# Patient Record
Sex: FEMALE | Race: BLACK OR AFRICAN AMERICAN | NOT HISPANIC OR LATINO | Employment: OTHER | URBAN - METROPOLITAN AREA
[De-identification: names, ages, dates, MRNs, and addresses within clinical notes are randomized per-mention and may not be internally consistent; named-entity substitution may affect disease eponyms.]

---

## 2019-02-10 ENCOUNTER — OFFICE VISIT (OUTPATIENT)
Dept: URGENT CARE | Facility: CLINIC | Age: 74
End: 2019-02-10
Payer: MEDICARE

## 2019-02-10 VITALS
DIASTOLIC BLOOD PRESSURE: 79 MMHG | HEART RATE: 81 BPM | BODY MASS INDEX: 26.46 KG/M2 | RESPIRATION RATE: 19 BRPM | HEIGHT: 64 IN | SYSTOLIC BLOOD PRESSURE: 117 MMHG | TEMPERATURE: 99 F | OXYGEN SATURATION: 98 % | WEIGHT: 155 LBS

## 2019-02-10 DIAGNOSIS — B34.9 VIRAL SYNDROME: Primary | ICD-10-CM

## 2019-02-10 DIAGNOSIS — R50.9 FEVER, UNSPECIFIED FEVER CAUSE: ICD-10-CM

## 2019-02-10 DIAGNOSIS — R68.89 FLU-LIKE SYMPTOMS: ICD-10-CM

## 2019-02-10 DIAGNOSIS — R11.0 NAUSEA: ICD-10-CM

## 2019-02-10 LAB
CTP QC/QA: YES
FLUAV AG NPH QL: NEGATIVE
FLUBV AG NPH QL: NEGATIVE

## 2019-02-10 PROCEDURE — 87804 INFLUENZA ASSAY W/OPTIC: CPT | Mod: QW,S$GLB,, | Performed by: FAMILY MEDICINE

## 2019-02-10 PROCEDURE — 99214 OFFICE O/P EST MOD 30 MIN: CPT | Mod: S$GLB,,, | Performed by: FAMILY MEDICINE

## 2019-02-10 PROCEDURE — 99214 PR OFFICE/OUTPT VISIT, EST, LEVL IV, 30-39 MIN: ICD-10-PCS | Mod: S$GLB,,, | Performed by: FAMILY MEDICINE

## 2019-02-10 PROCEDURE — 87804 POCT INFLUENZA A/B: ICD-10-PCS | Mod: 59,QW,S$GLB, | Performed by: FAMILY MEDICINE

## 2019-02-10 RX ORDER — TOPIRAMATE 100 MG/1
100 TABLET, FILM COATED ORAL
Status: ON HOLD | COMMUNITY
Start: 2019-01-14 | End: 2019-07-15 | Stop reason: CLARIF

## 2019-02-10 RX ORDER — ONDANSETRON 4 MG/1
4 TABLET, ORALLY DISINTEGRATING ORAL EVERY 8 HOURS PRN
Qty: 20 TABLET | Refills: 0 | Status: SHIPPED | OUTPATIENT
Start: 2019-02-10 | End: 2019-02-17

## 2019-02-10 RX ORDER — MECLIZINE HYDROCHLORIDE 25 MG/1
TABLET ORAL 3 TIMES DAILY PRN
COMMUNITY
Start: 2018-12-21

## 2019-02-10 RX ORDER — AMITRIPTYLINE HYDROCHLORIDE 25 MG/1
20 TABLET, FILM COATED ORAL NIGHTLY
Status: ON HOLD | COMMUNITY
Start: 2019-01-14 | End: 2019-07-15 | Stop reason: CLARIF

## 2019-02-10 RX ORDER — BUTALBITAL, ACETAMINOPHEN AND CAFFEINE 50; 325; 40 MG/1; MG/1; MG/1
TABLET ORAL
COMMUNITY
Start: 2018-12-17

## 2019-02-10 RX ORDER — OSELTAMIVIR PHOSPHATE 75 MG/1
75 CAPSULE ORAL 2 TIMES DAILY
Qty: 10 CAPSULE | Refills: 0 | Status: SHIPPED | OUTPATIENT
Start: 2019-02-10 | End: 2019-02-15

## 2019-02-10 RX ORDER — TORSEMIDE 20 MG/1
TABLET ORAL
COMMUNITY
Start: 2018-12-18

## 2019-02-10 RX ORDER — PROMETHAZINE HYDROCHLORIDE 12.5 MG/1
12.5 TABLET ORAL EVERY 6 HOURS PRN
Status: ON HOLD | COMMUNITY
End: 2019-10-10 | Stop reason: CLARIF

## 2019-02-10 NOTE — PATIENT INSTRUCTIONS
"  Viral Syndrome (Adult)  A viral illness may cause a number of symptoms. The symptoms depend on the part of the body that the virus affects. If it settles in your nose, throat, and lungs, it may cause cough, sore throat, congestion, and sometimes headache. If it settles in your stomach and intestinal tract, it may cause vomiting and diarrhea. Sometimes it causes vague symptoms like "aching all over," feeling tired, loss of appetite, or fever.  A viral illness usually lasts 1 to 2 weeks, but sometimes it lasts longer. In some cases, a more serious infection can look like a viral syndrome in the first few days of the illness. You may need another exam and additional tests to know the difference. Watch for the warning signs listed below.  Home care  Follow these guidelines for taking care of yourself at home:  · If symptoms are severe, rest at home for the first 2 to 3 days.  · Stay away from cigarette smoke - both your smoke and the smoke from others.  · You may use over-the-counter acetaminophen or ibuprofen for fever, muscle aching, and headache, unless another medicine was prescribed for this. If you have chronic liver or kidney disease or ever had a stomach ulcer or GI bleeding, talk with your doctor before using these medicines. No one who is younger than 18 and ill with a fever should take aspirin. It may cause severe disease or death.  · Your appetite may be poor, so a light diet is fine. Avoid dehydration by drinking 8 to 12 8-ounce glasses of fluids each day. This may include water; orange juice; lemonade; apple, grape, and cranberry juice; clear fruit drinks; electrolyte replacement and sports drinks; and decaffeinated teas and coffee. If you have been diagnosed with a kidney disease, ask your doctor how much and what types of fluids you should drink to prevent dehydration. If you have kidney disease, drinking too much fluid can cause it build up in the your body and be dangerous to your " health.  · Over-the-counter remedies won't shorten the length of the illness but may be helpful for cough, sore throat; and nasal and sinus congestion. Don't use decongestants if you have high blood pressure.  Follow-up care  Follow up with your healthcare provider if you do not improve over the next week.  Call 911  Get emergency medical care if any of the following occur:  · Convulsion  · Feeling weak, dizzy, or like you are going to faint  · Chest pain, shortness of breath, wheezing, or difficulty breathing  When to seek medical advice  Call your healthcare provider right away if any of these occur:  · Cough with lots of colored sputum (mucus) or blood in your sputum  · Chest pain, shortness of breath, wheezing, or difficulty breathing  · Severe headache; face, neck, or ear pain  · Severe, constant pain in the lower right side of your belly (abdominal)  · Continued vomiting (cant keep liquids down)  · Frequent diarrhea (more than 5 times a day); blood (red or black color) or mucus in diarrhea  · Feeling weak, dizzy, or like you are going to faint  · Extreme thirst  · Fever of 100.4°F (38°C) or higher, or as directed by your healthcare provider  Date Last Reviewed: 9/25/2015  © 7689-4369 [employer]. [address] [place] [address], [place] [address]. All rights reserved. This information is not intended as a substitute for professional medical care. Always follow your healthcare professional's instructions.      Follow up with your doctor in a few days as needed.  Return to the urgent care or go to the ER if symptoms get worse.    True Holman MD

## 2019-02-10 NOTE — PROGRESS NOTES
"Subjective:       Patient ID: Jose Francisco Martines is a 73 y.o. female.    Vitals:  height is 5' 4" (1.626 m) and weight is 70.3 kg (155 lb). Her oral temperature is 98.7 °F (37.1 °C). Her blood pressure is 117/79 and her pulse is 81. Her respiration is 19 and oxygen saturation is 98%.     Chief Complaint: URI (body aches, fever, sore throat, fatigue)    URI    This is a new problem. The current episode started in the past 7 days (Friday). The problem has been gradually worsening. The maximum temperature recorded prior to her arrival was 101 - 101.9 F. The fever has been present for 1 to 2 days. Associated symptoms include a sore throat. Pertinent negatives include no chest pain, congestion, coughing, diarrhea, dysuria, headaches, nausea, rash or vomiting. She has tried acetaminophen for the symptoms. The treatment provided mild relief.       Constitution: Positive for appetite change, chills, fatigue and fever.   HENT: Positive for sore throat. Negative for congestion.    Neck: Negative for painful lymph nodes.   Cardiovascular: Negative for chest pain and leg swelling.   Eyes: Negative for double vision and blurred vision.   Respiratory: Negative for cough and shortness of breath.    Gastrointestinal: Negative for nausea, vomiting and diarrhea.   Genitourinary: Negative for dysuria, frequency, urgency and history of kidney stones.   Musculoskeletal: Positive for muscle ache. Negative for joint pain, joint swelling and muscle cramps.   Skin: Negative for color change, pale, rash, erythema and bruising.   Allergic/Immunologic: Negative for seasonal allergies.   Neurological: Negative for dizziness, history of vertigo, light-headedness, passing out and headaches.   Hematologic/Lymphatic: Negative for swollen lymph nodes.   Psychiatric/Behavioral: Negative for nervous/anxious, sleep disturbance and depression. The patient is not nervous/anxious.        Objective:      Physical Exam   Constitutional: She is oriented to " person, place, and time. She appears well-developed and well-nourished.   HENT:   Head: Normocephalic and atraumatic.   Eyes: EOM are normal. Pupils are equal, round, and reactive to light.   Neck: Normal range of motion. Neck supple.   Cardiovascular: Normal rate and regular rhythm.   Murmur heard.  Pulmonary/Chest: Breath sounds normal. No respiratory distress. She has no wheezes. She has no rales.   Abdominal: Soft. Bowel sounds are normal. She exhibits no distension. There is no tenderness. There is no rebound and no guarding.   Lymphadenopathy:     She has no cervical adenopathy.   Neurological: She is alert and oriented to person, place, and time. No cranial nerve deficit. She exhibits normal muscle tone. Coordination normal.   Skin: Skin is warm. Capillary refill takes less than 2 seconds. No rash noted. No erythema. No pallor.   Psychiatric: She has a normal mood and affect. Her behavior is normal. Judgment and thought content normal.   Vitals reviewed.      Assessment:       1. Viral syndrome    2. Fever, unspecified fever cause    3. Flu-like symptoms    4. Nausea        Plan:         Viral syndrome  -     oseltamivir (TAMIFLU) 75 MG capsule; Take 1 capsule (75 mg total) by mouth 2 (two) times daily. for 5 days  Dispense: 10 capsule; Refill: 0    Fever, unspecified fever cause  -     POCT Influenza A/B    Flu-like symptoms  -     oseltamivir (TAMIFLU) 75 MG capsule; Take 1 capsule (75 mg total) by mouth 2 (two) times daily. for 5 days  Dispense: 10 capsule; Refill: 0    Nausea  -     ondansetron (ZOFRAN-ODT) 4 MG TbDL; Take 1 tablet (4 mg total) by mouth every 8 (eight) hours as needed (nausea/vomit).  Dispense: 20 tablet; Refill: 0          Patient Instructions     Viral Syndrome (Adult)  A viral illness may cause a number of symptoms. The symptoms depend on the part of the body that the virus affects. If it settles in your nose, throat, and lungs, it may cause cough, sore throat, congestion, and  "sometimes headache. If it settles in your stomach and intestinal tract, it may cause vomiting and diarrhea. Sometimes it causes vague symptoms like "aching all over," feeling tired, loss of appetite, or fever.  A viral illness usually lasts 1 to 2 weeks, but sometimes it lasts longer. In some cases, a more serious infection can look like a viral syndrome in the first few days of the illness. You may need another exam and additional tests to know the difference. Watch for the warning signs listed below.  Home care  Follow these guidelines for taking care of yourself at home:  · If symptoms are severe, rest at home for the first 2 to 3 days.  · Stay away from cigarette smoke - both your smoke and the smoke from others.  · You may use over-the-counter acetaminophen or ibuprofen for fever, muscle aching, and headache, unless another medicine was prescribed for this. If you have chronic liver or kidney disease or ever had a stomach ulcer or GI bleeding, talk with your doctor before using these medicines. No one who is younger than 18 and ill with a fever should take aspirin. It may cause severe disease or death.  · Your appetite may be poor, so a light diet is fine. Avoid dehydration by drinking 8 to 12 8-ounce glasses of fluids each day. This may include water; orange juice; lemonade; apple, grape, and cranberry juice; clear fruit drinks; electrolyte replacement and sports drinks; and decaffeinated teas and coffee. If you have been diagnosed with a kidney disease, ask your doctor how much and what types of fluids you should drink to prevent dehydration. If you have kidney disease, drinking too much fluid can cause it build up in the your body and be dangerous to your health.  · Over-the-counter remedies won't shorten the length of the illness but may be helpful for cough, sore throat; and nasal and sinus congestion. Don't use decongestants if you have high blood pressure.  Follow-up care  Follow up with your healthcare " provider if you do not improve over the next week.  Call 911  Get emergency medical care if any of the following occur:  · Convulsion  · Feeling weak, dizzy, or like you are going to faint  · Chest pain, shortness of breath, wheezing, or difficulty breathing  When to seek medical advice  Call your healthcare provider right away if any of these occur:  · Cough with lots of colored sputum (mucus) or blood in your sputum  · Chest pain, shortness of breath, wheezing, or difficulty breathing  · Severe headache; face, neck, or ear pain  · Severe, constant pain in the lower right side of your belly (abdominal)  · Continued vomiting (cant keep liquids down)  · Frequent diarrhea (more than 5 times a day); blood (red or black color) or mucus in diarrhea  · Feeling weak, dizzy, or like you are going to faint  · Extreme thirst  · Fever of 100.4°F (38°C) or higher, or as directed by your healthcare provider  Date Last Reviewed: 9/25/2015  © 1161-6002 [employer]. [address] [place] [address]. All rights reserved. This information is not intended as a substitute for professional medical care. Always follow your healthcare professional's instructions.      Follow up with your doctor in a few days as needed.  Return to the urgent care or go to the ER if symptoms get worse.    True Holman MD

## 2019-02-21 ENCOUNTER — HOSPITAL ENCOUNTER (OUTPATIENT)
Facility: OTHER | Age: 74
Discharge: HOME | End: 2019-02-21
Attending: INTERNAL MEDICINE | Admitting: INTERNAL MEDICINE
Payer: MEDICARE

## 2019-02-21 VITALS
TEMPERATURE: 98 F | DIASTOLIC BLOOD PRESSURE: 69 MMHG | RESPIRATION RATE: 16 BRPM | HEART RATE: 94 BPM | SYSTOLIC BLOOD PRESSURE: 112 MMHG | OXYGEN SATURATION: 99 % | HEIGHT: 64 IN | WEIGHT: 156 LBS | BODY MASS INDEX: 26.63 KG/M2

## 2019-02-21 DIAGNOSIS — N18.6 ESRD (END STAGE RENAL DISEASE): ICD-10-CM

## 2019-02-21 DIAGNOSIS — T82.898A PROBLEM WITH DIALYSIS ACCESS: ICD-10-CM

## 2019-02-21 LAB
APTT BLDCRRT: 78.7 SEC
INR PPP: 4.7
POCT GLUCOSE: 137 MG/DL (ref 70–110)
PROTHROMBIN TIME: 51.7 SEC

## 2019-02-21 PROCEDURE — 36415 COLL VENOUS BLD VENIPUNCTURE: CPT

## 2019-02-21 PROCEDURE — 85610 PROTHROMBIN TIME: CPT

## 2019-02-21 PROCEDURE — 82962 GLUCOSE BLOOD TEST: CPT | Performed by: INTERNAL MEDICINE

## 2019-02-21 PROCEDURE — 85730 THROMBOPLASTIN TIME PARTIAL: CPT

## 2019-02-21 RX ORDER — SODIUM CHLORIDE 0.9 % (FLUSH) 0.9 %
5 SYRINGE (ML) INJECTION
Status: DISCONTINUED | OUTPATIENT
Start: 2019-02-21 | End: 2019-02-21 | Stop reason: HOSPADM

## 2019-02-21 RX ORDER — LIDOCAINE HYDROCHLORIDE 10 MG/ML
1 INJECTION, SOLUTION EPIDURAL; INFILTRATION; INTRACAUDAL; PERINEURAL ONCE
Status: DISCONTINUED | OUTPATIENT
Start: 2019-02-21 | End: 2019-02-21 | Stop reason: HOSPADM

## 2019-02-21 NOTE — OR NURSING
Pt prefers to have fistulagram on a Thursday.  Fistulagram rescheduled for 2/28 and 9:30 am and told to arrive at 8:30.

## 2019-02-21 NOTE — OR NURSING
Pt's fistulagram cx'd due to elevated PT/INR and aPTT.  Pt was instructed by Dr. Wiley to stop taking coumadin for 3 days prior to rescheduled fistulagram on 2/28.  Pt/family and RN thought it wise to check with pt's cardiologist, Dr. Cuevas to make sure it would be ok from his standpoint for pt to stop the coumadin for that length of time.  I attempted to get in touch with Dr. Cuevas's office 2 times, however once leaving messages with the , my phone call was never returned.  Pt/family given food/drink while waiting.  After several hours of waiting for a call back, pt decided she would like to go home.  I told pt to call Dr. Cuevas and Dr. Guillen when she gets home to let them know what Dr. Wiley has advised.  AVS printed and reviewed with patient.

## 2019-02-21 NOTE — H&P
[facility] Cath Lab [address]  History & Physical    Subjective:      Chief Complaint/Reason for Admission: here for a fistulogram    Jose Francisco Martines is a 73 y.o. female with ESRD (MWF at [facility] with Dr. Guillen) who presents today for fistulogram after the dialysis unit recently noted downtrending adequacy and high arterial pressures.  She had this fistula placed by Dr. Montes in July, and had a R IJ TDC for approximately 6 months leading up to the use of her fistula.    Past Medical History:   Diagnosis Date    Asthma     Cardiac arrhythmia     Clotting disorder     Diabetes mellitus     Hypertension     Kidney disease      Past Surgical History:   Procedure Laterality Date    APPENDECTOMY      CARDIAC VALVE REPLACEMENT      OOPHORECTOMY       History reviewed. No pertinent family history.  Social History     Tobacco Use    Smoking status: Never Smoker    Smokeless tobacco: Never Used   Substance Use Topics    Alcohol use: Not on file    Drug use: Not on file       PTA Medications   Medication Sig    albuterol (ACCUNEB) 0.63 mg/3 mL Nebu Take 0.63 mg by nebulization every 6 (six) hours as needed. Rescue    amitriptyline (ELAVIL) 25 MG tablet     butalbital-acetaminophen-caffeine -40 mg (FIORICET, ESGIC) -40 mg per tablet     gabapentin (NEURONTIN) 100 MG capsule Take 100 mg by mouth 3 (three) times daily.    insulin glargine, TOUJEO, (TOUJEO) 300 unit/mL (1.5 mL) InPn pen Inject into the skin.    meclizine (ANTIVERT) 25 mg tablet     oxyCODONE-acetaminophen (PERCOCET)  mg per tablet Take 1 tablet by mouth every 4 (four) hours as needed for Pain.    pravastatin (PRAVACHOL) 40 MG tablet Take 40 mg by mouth once daily.    promethazine (PHENERGAN) 12.5 MG Tab Take 12.5 mg by mouth every 6 (six) hours as needed.    SITagliptin (JANUVIA) 25 MG Tab Take 100 mg by mouth once daily.    topiramate (TOPAMAX) 100 MG tablet     torsemide (DEMADEX) 20 MG Tab     warfarin  (COUMADIN) 10 MG tablet Take 10 mg by mouth once daily.    zolpidem (AMBIEN) 10 mg Tab Take 5 mg by mouth nightly as needed.     Review of patient's allergies indicates:   Allergen Reactions    Amitriptyline      Causes severe headache.    Codeine         Review of Systems   Constitutional: Negative.    Respiratory: Negative.    Cardiovascular: Negative.        Objective:      Vital Signs (Most Recent)       Vital Signs Range (Last 24H):       Physical Exam   Constitutional: She is oriented to person, place, and time. She appears well-developed and well-nourished. No distress.   HENT:   Head: Normocephalic and atraumatic.   Eyes: EOM are normal.   Neck: Neck supple.   Pulmonary/Chest: Effort normal. No respiratory distress.   Musculoskeletal:   R brachiobasilic AVF with soft thrill and minimal pulsatility, appropriate pulse augmentation and full collapse with arm elevation   Neurological: She is alert and oriented to person, place, and time.   Skin: Skin is warm and dry. She is not diaphoretic.   Psychiatric: She has a normal mood and affect. Her behavior is normal.       Assessment:      Active Hospital Problems    Diagnosis  POA    ESRD (end stage renal disease) [N18.6]  Yes      Resolved Hospital Problems   No resolved problems to display.       Plan:      Fistulogram today.  Risks explained, consent signed.  She is on coumadin for mitral valve replacement history, INR has been stable over the last 6 months, ranging 2.4-2.6.  We will check stat INR prior to proceeding.

## 2019-02-21 NOTE — DISCHARGE INSTRUCTIONS
Confirm with cardiologist about holding coumadin for 3 days prior to Tuesday.  2/21/19 PT/INR 51.7sec/ 4.7sec-- PTT 78.7sec    Do not take Insulin morning of procedure, may take blood pressure medications with a sip of water.    Return Tuesday for 10:30, nothing to eat or drink after midnight. You must have a ride home.

## 2019-02-21 NOTE — PROGRESS NOTES
U Interventional Nephrology Note    Ms. Martines notified me that she is on coumadin for a mitral valve prosthetic replacement.  She has had recent thromboembolic disease, as recently as September, so she is high risk for further thromboembolic events.  We checked an INR prior to the procedure, and it was elevated at 4.7 (which may be a result of recent antibiotic use).      I have discussed the case with her cardiologist, Dr. Cuevas, who recommends she hold her coumadin (since it is currently supratherapeutic), having the INR checked in the dialysis unit on Friday (tomorrow) as well as Monday.  He will see her in clinic Tuesday, and if the INR is at or around 2.0, she can be admitted to our hospitalist service for heparin gtt at [facility] [place], which we can hold just prior to the procedure.  This was discussed with the dialysis nurse (Miguelina) at [facility].      Stan Wiley MD

## 2019-02-26 ENCOUNTER — HOSPITAL ENCOUNTER (INPATIENT)
Facility: OTHER | Age: 74
LOS: 2 days | Discharge: HOME | DRG: 252 | End: 2019-03-03
Attending: EMERGENCY MEDICINE | Admitting: INTERNAL MEDICINE
Payer: MEDICARE

## 2019-02-26 DIAGNOSIS — T82.9XXD COMPLICATION OF VASCULAR ACCESS FOR DIALYSIS, SUBSEQUENT ENCOUNTER: ICD-10-CM

## 2019-02-26 DIAGNOSIS — N18.6 ESRD (END STAGE RENAL DISEASE) ON DIALYSIS: ICD-10-CM

## 2019-02-26 DIAGNOSIS — T82.590A MECHANICAL COMPLICATION OF ARTERIOVENOUS FISTULA SURGICALLY CREATED, INITIAL ENCOUNTER: ICD-10-CM

## 2019-02-26 DIAGNOSIS — N18.6 ESRD (END STAGE RENAL DISEASE): ICD-10-CM

## 2019-02-26 DIAGNOSIS — T82.898A: ICD-10-CM

## 2019-02-26 DIAGNOSIS — Z99.2 ESRD (END STAGE RENAL DISEASE) ON DIALYSIS: ICD-10-CM

## 2019-02-26 DIAGNOSIS — T82.898A INADEQUATE FLOW OF DIALYSIS ARTERIOVENOUS FISTULA, INITIAL ENCOUNTER: Primary | ICD-10-CM

## 2019-02-26 LAB
ALBUMIN SERPL BCP-MCNC: 2.7 G/DL
ALP SERPL-CCNC: 124 U/L
ALT SERPL W/O P-5'-P-CCNC: 15 U/L
ANION GAP SERPL CALC-SCNC: 15 MMOL/L
APTT BLDCRRT: 38.2 SEC
AST SERPL-CCNC: 25 U/L
BASOPHILS # BLD AUTO: 0.03 K/UL
BASOPHILS NFR BLD: 0.4 %
BILIRUB SERPL-MCNC: 1 MG/DL
BUN SERPL-MCNC: 45 MG/DL
CALCIUM SERPL-MCNC: 8.6 MG/DL
CHLORIDE SERPL-SCNC: 100 MMOL/L
CO2 SERPL-SCNC: 23 MMOL/L
CREAT SERPL-MCNC: 3.3 MG/DL
DIFFERENTIAL METHOD: ABNORMAL
EOSINOPHIL # BLD AUTO: 0.1 K/UL
EOSINOPHIL NFR BLD: 0.9 %
ERYTHROCYTE [DISTWIDTH] IN BLOOD BY AUTOMATED COUNT: 14.8 %
EST. GFR  (AFRICAN AMERICAN): 15 ML/MIN/1.73 M^2
EST. GFR  (NON AFRICAN AMERICAN): 13 ML/MIN/1.73 M^2
GLUCOSE SERPL-MCNC: 185 MG/DL
HCT VFR BLD AUTO: 29.7 %
HGB BLD-MCNC: 9.5 G/DL
INR PPP: 1.4
LYMPHOCYTES # BLD AUTO: 1.1 K/UL
LYMPHOCYTES NFR BLD: 14.9 %
MAGNESIUM SERPL-MCNC: 2.5 MG/DL
MCH RBC QN AUTO: 27.1 PG
MCHC RBC AUTO-ENTMCNC: 32 G/DL
MCV RBC AUTO: 85 FL
MONOCYTES # BLD AUTO: 0.6 K/UL
MONOCYTES NFR BLD: 7.8 %
NEUTROPHILS # BLD AUTO: 5.4 K/UL
NEUTROPHILS NFR BLD: 75.9 %
PHOSPHATE SERPL-MCNC: 5 MG/DL
PLATELET # BLD AUTO: 291 K/UL
PMV BLD AUTO: 9.5 FL
POTASSIUM SERPL-SCNC: 3.6 MMOL/L
PROT SERPL-MCNC: 7.2 G/DL
PROTHROMBIN TIME: 15.6 SEC
RBC # BLD AUTO: 3.5 M/UL
SODIUM SERPL-SCNC: 138 MMOL/L
WBC # BLD AUTO: 7.05 K/UL

## 2019-02-26 PROCEDURE — 93010 ELECTROCARDIOGRAM REPORT: CPT | Mod: ,,, | Performed by: INTERNAL MEDICINE

## 2019-02-26 PROCEDURE — 63600175 PHARM REV CODE 636 W HCPCS: Performed by: EMERGENCY MEDICINE

## 2019-02-26 PROCEDURE — 93010 EKG 12-LEAD: ICD-10-PCS | Mod: ,,, | Performed by: INTERNAL MEDICINE

## 2019-02-26 PROCEDURE — 86901 BLOOD TYPING SEROLOGIC RH(D): CPT

## 2019-02-26 PROCEDURE — 84100 ASSAY OF PHOSPHORUS: CPT

## 2019-02-26 PROCEDURE — 85610 PROTHROMBIN TIME: CPT

## 2019-02-26 PROCEDURE — 86905 BLOOD TYPING RBC ANTIGENS: CPT

## 2019-02-26 PROCEDURE — 80053 COMPREHEN METABOLIC PANEL: CPT

## 2019-02-26 PROCEDURE — 99285 EMERGENCY DEPT VISIT HI MDM: CPT | Mod: 25

## 2019-02-26 PROCEDURE — 96375 TX/PRO/DX INJ NEW DRUG ADDON: CPT

## 2019-02-26 PROCEDURE — 96365 THER/PROPH/DIAG IV INF INIT: CPT

## 2019-02-26 PROCEDURE — 86870 RBC ANTIBODY IDENTIFICATION: CPT

## 2019-02-26 PROCEDURE — G0378 HOSPITAL OBSERVATION PER HR: HCPCS

## 2019-02-26 PROCEDURE — 83735 ASSAY OF MAGNESIUM: CPT

## 2019-02-26 PROCEDURE — 85730 THROMBOPLASTIN TIME PARTIAL: CPT

## 2019-02-26 PROCEDURE — 85025 COMPLETE CBC W/AUTO DIFF WBC: CPT

## 2019-02-26 PROCEDURE — 93005 ELECTROCARDIOGRAM TRACING: CPT

## 2019-02-26 RX ORDER — HEPARIN SODIUM,PORCINE/D5W 25000/250
18 INTRAVENOUS SOLUTION INTRAVENOUS CONTINUOUS
Status: DISCONTINUED | OUTPATIENT
Start: 2019-02-26 | End: 2019-03-03 | Stop reason: HOSPADM

## 2019-02-26 RX ORDER — FOLIC ACID 0.8 MG
800 TABLET ORAL DAILY
COMMUNITY

## 2019-02-26 RX ORDER — CALCIUM ACETATE 667 MG/1
667 CAPSULE ORAL
COMMUNITY

## 2019-02-26 RX ADMIN — HEPARIN SODIUM 18 UNITS/KG/HR: 10000 INJECTION, SOLUTION INTRAVENOUS at 11:02

## 2019-02-27 PROBLEM — Z79.4 TYPE 2 DIABETES MELLITUS WITH KIDNEY COMPLICATION, WITH LONG-TERM CURRENT USE OF INSULIN: Status: ACTIVE | Noted: 2019-02-27

## 2019-02-27 PROBLEM — Z95.2 H/O PROSTHETIC MITRAL VALVE: Status: ACTIVE | Noted: 2019-02-27

## 2019-02-27 PROBLEM — E11.29 TYPE 2 DIABETES MELLITUS WITH KIDNEY COMPLICATION, WITH LONG-TERM CURRENT USE OF INSULIN: Status: ACTIVE | Noted: 2019-02-27

## 2019-02-27 LAB
ABO + RH BLD: NORMAL
ALBUMIN SERPL BCP-MCNC: 2.7 G/DL
ALP SERPL-CCNC: 119 U/L
ALT SERPL W/O P-5'-P-CCNC: 13 U/L
ANION GAP SERPL CALC-SCNC: 14 MMOL/L
APTT BLDCRRT: 150 SEC
APTT BLDCRRT: 59.1 SEC
APTT BLDCRRT: 71.1 SEC
APTT BLDCRRT: 71.6 SEC
AST SERPL-CCNC: 19 U/L
BASOPHILS # BLD AUTO: 0.03 K/UL
BASOPHILS NFR BLD: 0.4 %
BILIRUB SERPL-MCNC: 0.7 MG/DL
BLD GP AB SCN CELLS X3 SERPL QL: NORMAL
BLOOD GROUP ANTIBODIES SERPL: NORMAL
BUN SERPL-MCNC: 48 MG/DL
CALCIUM SERPL-MCNC: 8.7 MG/DL
CHLORIDE SERPL-SCNC: 100 MMOL/L
CHOLEST SERPL-MCNC: 124 MG/DL
CHOLEST/HDLC SERPL: 2.2 {RATIO}
CO2 SERPL-SCNC: 25 MMOL/L
CREAT SERPL-MCNC: 3.1 MG/DL
DIFFERENTIAL METHOD: ABNORMAL
EOSINOPHIL # BLD AUTO: 0.1 K/UL
EOSINOPHIL NFR BLD: 1.4 %
ERYTHROCYTE [DISTWIDTH] IN BLOOD BY AUTOMATED COUNT: 14.8 %
EST. GFR  (AFRICAN AMERICAN): 17 ML/MIN/1.73 M^2
EST. GFR  (NON AFRICAN AMERICAN): 14 ML/MIN/1.73 M^2
ESTIMATED AVG GLUCOSE: 146 MG/DL
GLUCOSE SERPL-MCNC: 92 MG/DL
HBA1C MFR BLD HPLC: 6.7 %
HCT VFR BLD AUTO: 28.3 %
HDLC SERPL-MCNC: 56 MG/DL
HDLC SERPL: 45.2 %
HGB BLD-MCNC: 9.1 G/DL
INR PPP: 1.3
LDLC SERPL CALC-MCNC: 57 MG/DL
LYMPHOCYTES # BLD AUTO: 1.5 K/UL
LYMPHOCYTES NFR BLD: 20.8 %
MAGNESIUM SERPL-MCNC: 2.3 MG/DL
MCH RBC QN AUTO: 27.2 PG
MCHC RBC AUTO-ENTMCNC: 32.2 G/DL
MCV RBC AUTO: 85 FL
MONOCYTES # BLD AUTO: 0.5 K/UL
MONOCYTES NFR BLD: 6.6 %
NEUTROPHILS # BLD AUTO: 5.2 K/UL
NEUTROPHILS NFR BLD: 70.7 %
NONHDLC SERPL-MCNC: 68 MG/DL
PHOSPHATE SERPL-MCNC: 4.9 MG/DL
PLATELET # BLD AUTO: 275 K/UL
PMV BLD AUTO: 9.2 FL
POCT GLUCOSE: 109 MG/DL (ref 70–110)
POCT GLUCOSE: 173 MG/DL (ref 70–110)
POCT GLUCOSE: 233 MG/DL (ref 70–110)
POCT GLUCOSE: 62 MG/DL (ref 70–110)
POCT GLUCOSE: 78 MG/DL (ref 70–110)
POCT GLUCOSE: 80 MG/DL (ref 70–110)
POCT GLUCOSE: 85 MG/DL (ref 70–110)
POTASSIUM SERPL-SCNC: 3.4 MMOL/L
PROT SERPL-MCNC: 6.5 G/DL
PROTHROMBIN TIME: 14.2 SEC
RBC # BLD AUTO: 3.35 M/UL
SODIUM SERPL-SCNC: 139 MMOL/L
TRIGL SERPL-MCNC: 55 MG/DL
WBC # BLD AUTO: 7.39 K/UL

## 2019-02-27 PROCEDURE — S5571 INSULIN DISPOS PEN 3 ML: HCPCS | Performed by: NURSE PRACTITIONER

## 2019-02-27 PROCEDURE — 63600175 PHARM REV CODE 636 W HCPCS: Performed by: NURSE PRACTITIONER

## 2019-02-27 PROCEDURE — 99226 PR SUBSEQUENT OBSERVATION CARE,LEVEL III: CPT | Mod: ,,, | Performed by: INTERNAL MEDICINE

## 2019-02-27 PROCEDURE — 84100 ASSAY OF PHOSPHORUS: CPT

## 2019-02-27 PROCEDURE — 36415 COLL VENOUS BLD VENIPUNCTURE: CPT

## 2019-02-27 PROCEDURE — 99220 PR INITIAL OBSERVATION CARE,LEVL III: ICD-10-PCS | Mod: ,,, | Performed by: NURSE PRACTITIONER

## 2019-02-27 PROCEDURE — 94761 N-INVAS EAR/PLS OXIMETRY MLT: CPT

## 2019-02-27 PROCEDURE — 85025 COMPLETE CBC W/AUTO DIFF WBC: CPT

## 2019-02-27 PROCEDURE — 25000003 PHARM REV CODE 250: Performed by: NURSE PRACTITIONER

## 2019-02-27 PROCEDURE — 85730 THROMBOPLASTIN TIME PARTIAL: CPT

## 2019-02-27 PROCEDURE — 99226 PR SUBSEQUENT OBSERVATION CARE,LEVEL III: ICD-10-PCS | Mod: ,,, | Performed by: INTERNAL MEDICINE

## 2019-02-27 PROCEDURE — 99220 PR INITIAL OBSERVATION CARE,LEVL III: CPT | Mod: ,,, | Performed by: NURSE PRACTITIONER

## 2019-02-27 PROCEDURE — G0378 HOSPITAL OBSERVATION PER HR: HCPCS

## 2019-02-27 PROCEDURE — 80100016 HC MAINTENANCE HEMODIALYSIS

## 2019-02-27 PROCEDURE — 80053 COMPREHEN METABOLIC PANEL: CPT

## 2019-02-27 PROCEDURE — 85610 PROTHROMBIN TIME: CPT

## 2019-02-27 PROCEDURE — 83036 HEMOGLOBIN GLYCOSYLATED A1C: CPT

## 2019-02-27 PROCEDURE — 83735 ASSAY OF MAGNESIUM: CPT

## 2019-02-27 PROCEDURE — 80061 LIPID PANEL: CPT

## 2019-02-27 PROCEDURE — 85730 THROMBOPLASTIN TIME PARTIAL: CPT | Mod: 91

## 2019-02-27 RX ORDER — ONDANSETRON 8 MG/1
8 TABLET, ORALLY DISINTEGRATING ORAL EVERY 8 HOURS PRN
Status: DISCONTINUED | OUTPATIENT
Start: 2019-02-27 | End: 2019-03-03 | Stop reason: HOSPADM

## 2019-02-27 RX ORDER — INSULIN ASPART 100 [IU]/ML
0-5 INJECTION, SOLUTION INTRAVENOUS; SUBCUTANEOUS
Status: DISCONTINUED | OUTPATIENT
Start: 2019-02-27 | End: 2019-03-03 | Stop reason: HOSPADM

## 2019-02-27 RX ORDER — GLUCAGON 1 MG
1 KIT INJECTION
Status: DISCONTINUED | OUTPATIENT
Start: 2019-02-27 | End: 2019-03-03 | Stop reason: HOSPADM

## 2019-02-27 RX ORDER — TORSEMIDE 10 MG/1
20 TABLET ORAL
Status: DISCONTINUED | OUTPATIENT
Start: 2019-02-28 | End: 2019-03-03 | Stop reason: HOSPADM

## 2019-02-27 RX ORDER — SODIUM CHLORIDE 9 MG/ML
INJECTION, SOLUTION INTRAVENOUS ONCE
Status: DISCONTINUED | OUTPATIENT
Start: 2019-02-27 | End: 2019-03-03 | Stop reason: HOSPADM

## 2019-02-27 RX ORDER — ACETAMINOPHEN 325 MG/1
650 TABLET ORAL EVERY 4 HOURS PRN
Status: DISCONTINUED | OUTPATIENT
Start: 2019-02-27 | End: 2019-03-03 | Stop reason: HOSPADM

## 2019-02-27 RX ORDER — GABAPENTIN 100 MG/1
100 CAPSULE ORAL 2 TIMES DAILY
Status: DISCONTINUED | OUTPATIENT
Start: 2019-02-27 | End: 2019-03-03 | Stop reason: HOSPADM

## 2019-02-27 RX ORDER — IBUPROFEN 200 MG
24 TABLET ORAL
Status: DISCONTINUED | OUTPATIENT
Start: 2019-02-27 | End: 2019-03-03 | Stop reason: HOSPADM

## 2019-02-27 RX ORDER — ZOLPIDEM TARTRATE 5 MG/1
5 TABLET ORAL NIGHTLY PRN
Status: DISCONTINUED | OUTPATIENT
Start: 2019-02-27 | End: 2019-03-03 | Stop reason: HOSPADM

## 2019-02-27 RX ORDER — SODIUM CHLORIDE 0.9 % (FLUSH) 0.9 %
5 SYRINGE (ML) INJECTION
Status: DISCONTINUED | OUTPATIENT
Start: 2019-02-27 | End: 2019-03-03 | Stop reason: HOSPADM

## 2019-02-27 RX ORDER — PRAVASTATIN SODIUM 20 MG/1
40 TABLET ORAL DAILY
Status: DISCONTINUED | OUTPATIENT
Start: 2019-02-27 | End: 2019-02-28

## 2019-02-27 RX ORDER — FOLIC ACID 0.8 MG
800 TABLET ORAL DAILY
Status: DISCONTINUED | OUTPATIENT
Start: 2019-02-27 | End: 2019-02-27 | Stop reason: RX

## 2019-02-27 RX ORDER — IBUPROFEN 200 MG
16 TABLET ORAL
Status: DISCONTINUED | OUTPATIENT
Start: 2019-02-27 | End: 2019-03-03 | Stop reason: HOSPADM

## 2019-02-27 RX ORDER — CALCIUM ACETATE 667 MG/1
667 CAPSULE ORAL
Status: DISCONTINUED | OUTPATIENT
Start: 2019-02-27 | End: 2019-03-03 | Stop reason: HOSPADM

## 2019-02-27 RX ORDER — FOLIC ACID 1 MG/1
1 TABLET ORAL DAILY
Status: DISCONTINUED | OUTPATIENT
Start: 2019-02-27 | End: 2019-03-03 | Stop reason: HOSPADM

## 2019-02-27 RX ADMIN — CALCIUM ACETATE 667 MG: 667 CAPSULE ORAL at 04:02

## 2019-02-27 RX ADMIN — GABAPENTIN 100 MG: 100 CAPSULE ORAL at 09:02

## 2019-02-27 RX ADMIN — ACETAMINOPHEN 650 MG: 325 TABLET, FILM COATED ORAL at 10:02

## 2019-02-27 RX ADMIN — HEPARIN SODIUM 14 UNITS/KG/HR: 10000 INJECTION, SOLUTION INTRAVENOUS at 09:02

## 2019-02-27 RX ADMIN — ERYTHROPOIETIN 20000 UNITS: 20000 INJECTION, SOLUTION INTRAVENOUS; SUBCUTANEOUS at 11:02

## 2019-02-27 RX ADMIN — DEXTROSE MONOHYDRATE 12.5 G: 25 INJECTION, SOLUTION INTRAVENOUS at 08:02

## 2019-02-27 RX ADMIN — INSULIN DETEMIR 10 UNITS: 100 INJECTION, SOLUTION SUBCUTANEOUS at 01:02

## 2019-02-27 NOTE — H&P
[facility] Hospital Medicine  History & Physical    Patient Name: Jose Francisco Martines  MRN: [id number]  Admission Date: 2/26/2019  Attending Physician: Adebayo Blakely MD   Primary Care Provider: Yobani Wang MD         Patient information was obtained from patient, past medical records and ER records.     Subjective:     Principal Problem:Inadequate flow of hemodialysis AV fistula    Chief Complaint:   Chief Complaint   Patient presents with    Vascular Access Problem     here to have fistulogram by Inez        HPI: The patient is a 73 y.o. female, with history of ESRD, DM, HTN, and on chronic anticoagulation due to MVR for mitral valve prolapse, who presents to the ED on recommendation by Dr. Wiley (Nephrology) to be started on a heparin drip. Patient has a history of clotting disorder and is scheduled to have a fistulagram tomorrow due to high arterial pressure. She reports that her fistula is still being used for dialysis, and she last completed a full session of dialysis yesterday. Patient states she has been compliant with Warfarin (10 mg) for MVR. She reports that she has not taken Coumadin for a week because her INR level was 5.2 last week. Patient states she was evaluated by her cardiologist today, and her INR was 1.9. She denies history of DVT or PE. She has been at baseline the last few weeks. She has not been experiencing fevers, chills, headaches, dizziness, congestion, rhinorrhea, sore throat, cough, SOB, chest pain, abdominal pain, N/V/D, dysuria, urinary frequency, or urinary urgency. She has no complaints.         Past Medical History:   Diagnosis Date    Asthma     Cardiac arrhythmia     Clotting disorder     Diabetes mellitus     Hypertension     Kidney disease        Past Surgical History:   Procedure Laterality Date    APPENDECTOMY      CARDIAC VALVE REPLACEMENT      FISTULOGRAM Right 2/21/2019    Performed by Stan Wiley MD at [facility] CATH LAB     OOPHORECTOMY         Review of patient's allergies indicates:   Allergen Reactions    Amitriptyline      Causes severe headache.    Codeine        No current facility-administered medications on file prior to encounter.      Current Outpatient Medications on File Prior to Encounter   Medication Sig    calcium acetate (PHOSLO) 667 mg capsule Take 667 mg by mouth 3 (three) times daily with meals.    folic acid (FOLVITE) 800 MCG Tab Take 800 mcg by mouth once daily.    insulin glargine, TOUJEO, (TOUJEO) 300 unit/mL (1.5 mL) InPn pen Inject 12 Units into the skin every evening.     pravastatin (PRAVACHOL) 40 MG tablet Take 40 mg by mouth once daily.    SITagliptin (JANUVIA) 25 MG Tab Take 100 mg by mouth once daily.    torsemide (DEMADEX) 20 MG Tab     warfarin (COUMADIN) 10 MG tablet Take 10 mg by mouth once daily.    albuterol (ACCUNEB) 0.63 mg/3 mL Nebu Take 0.63 mg by nebulization every 6 (six) hours as needed. Rescue    amitriptyline (ELAVIL) 25 MG tablet 20 mg every evening.     butalbital-acetaminophen-caffeine -40 mg (FIORICET, ESGIC) -40 mg per tablet     gabapentin (NEURONTIN) 100 MG capsule Take 100 mg by mouth 2 (two) times daily.     meclizine (ANTIVERT) 25 mg tablet 3 (three) times daily as needed.     oxyCODONE-acetaminophen (PERCOCET)  mg per tablet Take 1 tablet by mouth every 4 (four) hours as needed for Pain.    promethazine (PHENERGAN) 12.5 MG Tab Take 12.5 mg by mouth every 6 (six) hours as needed.    topiramate (TOPAMAX) 100 MG tablet 100 mg.     zolpidem (AMBIEN) 10 mg Tab Take 5 mg by mouth nightly as needed.     Family History     None        Tobacco Use    Smoking status: Never Smoker    Smokeless tobacco: Never Used   Substance and Sexual Activity    Alcohol use: Not on file    Drug use: Not on file    Sexual activity: Not on file     Review of Systems   Constitutional: Negative for activity change, appetite change and fever.   HENT: Negative for  congestion, ear pain, rhinorrhea and sinus pressure.    Eyes: Negative for pain and discharge.   Respiratory: Negative for cough, chest tightness, shortness of breath and wheezing.    Cardiovascular: Negative for chest pain and leg swelling.   Gastrointestinal: Negative for abdominal distention, abdominal pain, diarrhea, nausea and vomiting.   Endocrine: Negative for cold intolerance and heat intolerance.   Genitourinary: Negative for difficulty urinating, flank pain, frequency, hematuria and urgency.   Musculoskeletal: Negative for arthralgias, joint swelling and myalgias.   Allergic/Immunologic: Negative for environmental allergies and food allergies.   Neurological: Negative for dizziness, weakness, light-headedness and headaches.   Hematological: Does not bruise/bleed easily.   Psychiatric/Behavioral: Negative for agitation, behavioral problems and decreased concentration.     Objective:     Vital Signs (Most Recent):  Temp: 98.2 °F (36.8 °C) (02/27/19 0026)  Pulse: 74 (02/27/19 0015)  Resp: (!) 23 (02/27/19 0016)  BP: 120/69 (02/27/19 0015)  SpO2: 96 % (02/27/19 0016) Vital Signs (24h Range):  Temp:  [98.2 °F (36.8 °C)-98.5 °F (36.9 °C)] 98.2 °F (36.8 °C)  Pulse:  [74-89] 74  Resp:  [18-23] 23  SpO2:  [96 %-99 %] 96 %  BP: (116-134)/(66-73) 120/69     Weight: 68.5 kg (151 lb)  Body mass index is 25.92 kg/m².    Physical Exam   Constitutional: She is oriented to person, place, and time. She appears well-developed and well-nourished.   HENT:   Head: Normocephalic.   Eyes: Conjunctivae are normal. Right eye exhibits no discharge. Left eye exhibits no discharge.   Neck: Normal range of motion. Neck supple.   Cardiovascular: Normal rate, regular rhythm, normal heart sounds and intact distal pulses.   Pulmonary/Chest: Effort normal and breath sounds normal. No respiratory distress.   Abdominal: Soft. Bowel sounds are normal. She exhibits no distension. There is no tenderness.   Musculoskeletal: Normal range of  motion.   Neurological: She is alert and oriented to person, place, and time.   Skin: Skin is warm and dry.   Psychiatric: She has a normal mood and affect. Her behavior is normal. Thought content normal.           Significant Labs:   CBC:   Recent Labs   Lab 02/26/19 2237   WBC 7.05   HGB 9.5*   HCT 29.7*        CMP:   Recent Labs   Lab 02/26/19 2237      K 3.6      CO2 23   *   BUN 45*   CREATININE 3.3*   CALCIUM 8.6*   PROT 7.2   ALBUMIN 2.7*   BILITOT 1.0   ALKPHOS 124   AST 25   ALT 15   ANIONGAP 15   EGFRNONAA 13*       Significant Imaging: I have reviewed all pertinent imaging results/findings within the past 24 hours.    Assessment/Plan:     * Inadequate flow of hemodialysis AV fistula    Scheduled for fistulogram with Dr. Wiley when INR less than 2.  INR- 1.9/1.4    Heparin drip  Consult Nephrology     ESRD (end stage renal disease)    Consult Nephrology         VTE Risk Mitigation (From admission, onward)        Ordered     heparin 25,000 units in dextrose 5% 250 mL (100 units/mL) infusion HIGH INTENSITY nomogram - [facility]  Continuous      02/26/19 2236     heparin 25,000 units in dextrose 5% (100 units/ml) IV bolus from bag - ADDITIONAL PRN BOLUS - 60 units/kg  As needed (PRN)      02/26/19 2236     heparin 25,000 units in dextrose 5% (100 units/ml) IV bolus from bag - ADDITIONAL PRN BOLUS - 30 units/kg  As needed (PRN)      02/26/19 2236             Jl Lu NP  Department of Hospital Medicine   [facility]

## 2019-02-27 NOTE — PROGRESS NOTES
Dr. Wiley states he is not able to do pt procedure today so diet ordered and to make pt npo after mn.

## 2019-02-27 NOTE — PLAN OF CARE
Pt off the unit.  Chart reviewed.  Will assess later today or tomorrow.  Spoke with bedside nurse, reviewed care plan.       02/27/19 1301   Discharge Assessment   Assessment Type Discharge Planning Assessment   Confirmed/corrected address and phone number on facesheet? No   Assessment information obtained from? Caregiver;Medical Record   Communicated expected length of stay with patient/caregiver no   Patient currently being followed by outpatient case management? Unable to determine (comments)   Do you have any problems affording any of your prescribed medications? TBD   Patient/Family in Agreement with Plan unable to assess

## 2019-02-27 NOTE — PROGRESS NOTES
Pt has iv in hand and heparin going and shunt in left arm and unable to be stuck in that arm.  Will put in for redraw and turn heparin off for lab to be drawn.

## 2019-02-27 NOTE — CONSULTS
Consult Note  Nephrology    Consult Requested By: Adebayo Blakely MD  Reason for Consult: ESRD    SUBJECTIVE:     History of Present Illness:  Patient is a 73 y.o. female presents with scheduled fistulogram this morning by [facility]phrology for persistent elevated arterial pressures as an outpatient.  Extensive medical history as outlined below including prosthetic valve replacement and on long-term Coumadin.  Admitted for heparin drip prior to fistulogram.  Patient known to our group and dialyzes on Mondays Wednesdays and Fridays at [facility] under Dr. Mcneill.  Consulted this morning for dialysis needs.    Patient seen and examined.  Case discussed with treatment team.    Epic reviewed.    Currently denies any chest pain, shortness of breath, nausea, vomiting, diarrhea, fever, chills.    Past Medical History:   Diagnosis Date    Asthma     Cardiac arrhythmia     Clotting disorder     Diabetes mellitus     End stage renal failure on dialysis     Hypertension      Past Surgical History:   Procedure Laterality Date    APPENDECTOMY      CARDIAC VALVE REPLACEMENT      FISTULOGRAM Right 2/21/2019    Performed by Stan [facility] MD at [facility] CATH LAB    OOPHORECTOMY       History reviewed. No pertinent family history.  Social History     Tobacco Use    Smoking status: Never Smoker    Smokeless tobacco: Never Used   Substance Use Topics    Alcohol use: Not on file    Drug use: Not on file       Review of patient's allergies indicates:   Allergen Reactions    Amitriptyline      Causes severe headache.    Codeine         Review of Systems:  Constitutional: No fever or chills  Respiratory: No cough or shortness of breath  Cardiovascular: No chest pain or palpitations  Gastrointestinal: No nausea or vomiting  Neurological: No confusion or weakness    OBJECTIVE:     Vital Signs (Most Recent)  Temp: 97.8 °F (36.6 °C) (02/27/19 0947)  Pulse: 80 (02/27/19 1015)  Resp: 18 (02/27/19 0947)  BP: 119/79  (02/27/19 1015)  SpO2: 98 % (02/27/19 0759)    Vital Signs Range (Last 24H):  Temp:  [97.8 °F (36.6 °C)-98.5 °F (36.9 °C)]   Pulse:  [74-89]   Resp:  [16-23]   BP: (116-134)/(66-79)   SpO2:  [96 %-100 %]       Intake/Output Summary (Last 24 hours) at 2/27/2019 1032  Last data filed at 2/27/2019 0635  Gross per 24 hour   Intake 79.02 ml   Output --   Net 79.02 ml       Physical Exam:  General appearance: Well developed, well nourished  Eyes:  Conjunctivae/corneas clear. PERRL.  Lungs: Normal respiratory effort,   clear to auscultation bilaterally   Heart: Regular rate and rhythm, S1, S2 normal, no murmur, rub or roopa. +valve click  Abdomen: Soft, non-tender non-distended; bowel sounds normal; no masses,  no organomegaly  Extremities: No cyanosis or clubbing. No edema.    Skin: Skin color, texture, turgor normal. No rashes or lesions  Neurologic: Normal strength and tone. No focal numbness or weakness   Right arm AVF      Laboratory:  Recent Labs   Lab 02/27/19  0558   WBC 7.39   RBC 3.35*   HGB 9.1*   HCT 28.3*      MCV 85   MCH 27.2   MCHC 32.2     BMP:   Recent Labs   Lab 02/27/19  0558   GLU 92      K 3.4*      CO2 25   BUN 48*   CREATININE 3.1*   CALCIUM 8.7   MG 2.3     Lab Results   Component Value Date    CALCIUM 8.7 02/27/2019    PHOS 4.9 (H) 02/27/2019     BNP  No results for input(s): BNP, BNPTRIAGEBLO in the last 168 hours.No results found for: URICACIDNo results found for: IRON, TIBC, FERRITIN, SATURATEDIRO  Lab Results   Component Value Date    CALCIUM 8.7 02/27/2019    PHOS 4.9 (H) 02/27/2019     Recent Labs   Lab 02/26/19 2237 02/27/19  0558   INR 1.4*  --    APTT 38.2* 150.0*       Diagnostic Results:  X-Ray Chest AP Portable   Final Result      See above.         Electronically signed by: Fernando López MD   Date:    02/26/2019   Time:    22:38          ASSESSMENT/PLAN:     1. End-stage renal disease on hemodialysis Monday was and Fridays at [facility] (N18.6, Z  99.2):  Followed by Dr. Mcneill at [facility] 2nd shift.  Routine HD this am prior to fistulogram.  Consent signed.  Updated HD RN.  Renally dose meds, avoid nephrotoxins, and monitor I/O's closely.  2. AVF Malfxn (T82.447L):  Heparin drip as now prior to fistulogram.  Has had declining Kt/V in recent months.  F/U fistulogram results by Dr. Wiley.  3. Prosthetic mitral valve on Coumadin (Z95.2):  Followed by Dr. Cuevas.  Heparin drip for now.  Resume coumadin following fistulogram today.    4. Anemia of CKD (D63.1):  epo today on HD.   5. DM with hypoglycemia this am (E16.2):  CBG 62 this am secondary to basal insulin given while NPO.  D50 given.  Hold basal and use only SSI for now.  Monitor CBG.     Thanks for consult  See above  Will follow along.

## 2019-02-27 NOTE — PROGRESS NOTES
Lab called and states that she is unable to obtain a PTT result so am putting to be drawn again.  Will notify md.

## 2019-02-27 NOTE — PLAN OF CARE
Problem: Adult Inpatient Plan of Care  Goal: Patient-Specific Goal (Individualization)  Outcome: Ongoing (interventions implemented as appropriate)  Bed in low and locked position and able to reposition and amb per self.  Remains free of injury during shift.  HD completed and heparin drip infusing as ordered.  PTT due at 1500.

## 2019-02-27 NOTE — ASSESSMENT & PLAN NOTE
Asymptomatic hypoglycemia this AM  Pt NPO for procedure  Hold long acting insulin till eating  Cover with SSI

## 2019-02-27 NOTE — SUBJECTIVE & OBJECTIVE
Interval History:   Denies pain  NO SOB/CP    Review of Systems   Constitutional: Negative for chills and fever.   Respiratory: Negative for chest tightness and shortness of breath.    Cardiovascular: Negative for chest pain, palpitations and leg swelling.   Gastrointestinal: Negative for abdominal pain and blood in stool.   Genitourinary: Negative for dysuria, flank pain and frequency.   Musculoskeletal: Negative for arthralgias, back pain and joint swelling.   Neurological: Negative for seizures and syncope.   Hematological: Does not bruise/bleed easily.     Objective:     Vital Signs (Most Recent):  Temp: 97.8 °F (36.6 °C) (02/27/19 0947)  Pulse: 80 (02/27/19 1230)  Resp: 18 (02/27/19 0947)  BP: 118/80 (02/27/19 1230)  SpO2: 98 % (02/27/19 0759) Vital Signs (24h Range):  Temp:  [97.8 °F (36.6 °C)-98.5 °F (36.9 °C)] 97.8 °F (36.6 °C)  Pulse:  [60-89] 80  Resp:  [16-23] 18  SpO2:  [96 %-100 %] 98 %  BP: (115-134)/(66-84) 118/80     Weight: 59.3 kg (130 lb 11.7 oz)  Body mass index is 22.44 kg/m².    Intake/Output Summary (Last 24 hours) at 2/27/2019 1238  Last data filed at 2/27/2019 0635  Gross per 24 hour   Intake 79.02 ml   Output --   Net 79.02 ml      Physical Exam   Constitutional: She is oriented to person, place, and time. She appears well-developed and well-nourished. No distress.   HENT:   Head: Normocephalic and atraumatic.   Eyes: Conjunctivae are normal.   Cardiovascular: Regular rhythm, normal heart sounds and intact distal pulses. Exam reveals no gallop.   No murmur heard.  Pulmonary/Chest: Effort normal and breath sounds normal. No stridor. She has no wheezes.   Abdominal: Soft. Bowel sounds are normal. She exhibits no distension. There is no tenderness.   Neurological: She is alert and oriented to person, place, and time.   Skin: Skin is warm and dry. She is not diaphoretic. No erythema.   Psychiatric: She has a normal mood and affect.   Nursing note and vitals reviewed.      Significant Labs:    BMP:   Recent Labs   Lab 02/27/19  0558   GLU 92      K 3.4*      CO2 25   BUN 48*   CREATININE 3.1*   CALCIUM 8.7   MG 2.3       Significant Imaging: I have reviewed all pertinent imaging results/findings within the past 24 hours.

## 2019-02-27 NOTE — ED NOTES
NEURO: Pt AAO x 4. Behavior and speech appropriate to situation.   CARDIAC: pt denies chest pain  RESPIRATORY: Respirations even and unlabored.  Pt denies SOB   MUSCULOSKELETAL: Active ROM noted to extremities

## 2019-02-27 NOTE — PROGRESS NOTES
Spoke with lab and notified them that a inr was ordered but they ran a ptt and the ptt is due for 1500 as ordered an a pt inr is still needed and lab verbalized understanding.

## 2019-02-27 NOTE — ED TRIAGE NOTES
Pt presents with c/o vascular problem. Pt last dialyzed Monday, reports had a full session. Pt states she had high arterial pressure and issue with blood return and pts INR 1.9 and pt directed to come to hospital for heparin drip until he has a fistulagram. Pt denies any symptoms. Thrill palpable,  Right Radial pulse + 2, cap RF < 3 to right hand. Mild pitting edema noted to bilateral lower legs. female visitor at bedside. Will continue to monitor

## 2019-02-27 NOTE — PROGRESS NOTES
[facility] Hospital Medicine  Progress Note    Patient Name: Jose Francisco Martines  MRN: [id number]  Patient Class: OP- Observation   Admission Date: 2/26/2019  Length of Stay: 0 days  Attending Physician: Adebayo Blakely MD  Primary Care Provider: Yobani Wang MD        Subjective:     Principal Problem:Inadequate flow of hemodialysis AV fistula    HPI:  The patient is a 73 y.o. female, with history of ESRD, DM, HTN, and on chronic anticoagulation due to MVR for mitral valve prolapse, who presents to the ED on recommendation by Dr. Wiley (Nephrology) to be started on a heparin drip. Patient has a history of clotting disorder and is scheduled to have a fistulagram tomorrow due to high arterial pressure. She reports that her fistula is still being used for dialysis, and she last completed a full session of dialysis yesterday. Patient states she has been compliant with Warfarin (10 mg) for MVR. She reports that she has not taken Coumadin for a week because her INR level was 5.2 last week. Patient states she was evaluated by her cardiologist today, and her INR was 1.9. She denies history of DVT or PE. She has been at baseline the last few weeks. She has not been experiencing fevers, chills, headaches, dizziness, congestion, rhinorrhea, sore throat, cough, SOB, chest pain, abdominal pain, N/V/D, dysuria, urinary frequency, or urinary urgency. She has no complaints.         Hospital Course:  No notes on file    Interval History:   Denies pain  NO SOB/CP    Review of Systems   Constitutional: Negative for chills and fever.   Respiratory: Negative for chest tightness and shortness of breath.    Cardiovascular: Negative for chest pain, palpitations and leg swelling.   Gastrointestinal: Negative for abdominal pain and blood in stool.   Genitourinary: Negative for dysuria, flank pain and frequency.   Musculoskeletal: Negative for arthralgias, back pain and joint swelling.   Neurological: Negative for  seizures and syncope.   Hematological: Does not bruise/bleed easily.     Objective:     Vital Signs (Most Recent):  Temp: 97.8 °F (36.6 °C) (02/27/19 0947)  Pulse: 80 (02/27/19 1230)  Resp: 18 (02/27/19 0947)  BP: 118/80 (02/27/19 1230)  SpO2: 98 % (02/27/19 0759) Vital Signs (24h Range):  Temp:  [97.8 °F (36.6 °C)-98.5 °F (36.9 °C)] 97.8 °F (36.6 °C)  Pulse:  [60-89] 80  Resp:  [16-23] 18  SpO2:  [96 %-100 %] 98 %  BP: (115-134)/(66-84) 118/80     Weight: 59.3 kg (130 lb 11.7 oz)  Body mass index is 22.44 kg/m².    Intake/Output Summary (Last 24 hours) at 2/27/2019 1238  Last data filed at 2/27/2019 0635  Gross per 24 hour   Intake 79.02 ml   Output --   Net 79.02 ml      Physical Exam   Constitutional: She is oriented to person, place, and time. She appears well-developed and well-nourished. No distress.   HENT:   Head: Normocephalic and atraumatic.   Eyes: Conjunctivae are normal.   Cardiovascular: Regular rhythm, normal heart sounds and intact distal pulses. Exam reveals no gallop.   No murmur heard.  Pulmonary/Chest: Effort normal and breath sounds normal. No stridor. She has no wheezes.   Abdominal: Soft. Bowel sounds are normal. She exhibits no distension. There is no tenderness.   Neurological: She is alert and oriented to person, place, and time.   Skin: Skin is warm and dry. She is not diaphoretic. No erythema.   Psychiatric: She has a normal mood and affect.   Nursing note and vitals reviewed.      Significant Labs:   BMP:   Recent Labs   Lab 02/27/19  0558   GLU 92      K 3.4*      CO2 25   BUN 48*   CREATININE 3.1*   CALCIUM 8.7   MG 2.3       Significant Imaging: I have reviewed all pertinent imaging results/findings within the past 24 hours.    Assessment/Plan:      * Inadequate flow of hemodialysis AV fistula    Scheduled for fistulogram with Dr. Wiley today       Type 2 diabetes mellitus with kidney complication, with long-term current use of insulin    Asymptomatic hypoglycemia this  AM  Pt NPO for procedure  Hold long acting insulin till eating  Cover with SSI       H/O prosthetic mitral valve    Coumadin held for fistulogram.  Heparin started.  After procedure can transition back to oral anticoagulation       ESRD (end stage renal disease)    Consult Nephrology  HD today after fistulogram         VTE Risk Mitigation (From admission, onward)        Ordered     Place sequential compression device  Until discontinued      02/27/19 0104     IP VTE HIGH RISK PATIENT  Once      02/27/19 0104     Reason for No Pharmacological VTE Prophylaxis  Once      02/27/19 0104     Place sequential compression device  Until discontinued      02/27/19 0104     heparin 25,000 units in dextrose 5% 250 mL (100 units/mL) infusion HIGH INTENSITY nomogram - [facility]  Continuous      02/26/19 2236     heparin 25,000 units in dextrose 5% (100 units/ml) IV bolus from bag - ADDITIONAL PRN BOLUS - 60 units/kg  As needed (PRN)      02/26/19 2236     heparin 25,000 units in dextrose 5% (100 units/ml) IV bolus from bag - ADDITIONAL PRN BOLUS - 30 units/kg  As needed (PRN)      02/26/19 2236              Adebayo Blakely MD  Department of Hospital Medicine   [facility]

## 2019-02-27 NOTE — ASSESSMENT & PLAN NOTE
Scheduled for fistulogram with Dr. Wiley when INR less than 2.  INR- 1.9/1.4    Heparin drip  Consult Nephrology

## 2019-02-27 NOTE — ED PROVIDER NOTES
Encounter Date: 2/26/2019    SCRIBE #1 NOTE: I, Lars Gibson, am scribing for, and in the presence of, Dr. Palma .       History     Chief Complaint   Patient presents with    Vascular Access Problem     here to have fistulogram by Inez     Time seen by provider: 10:11 PM    This is a 73 y.o. female, with history of ESRD, DM, HTN, and on chronic anticoagulation due to MVR for mitral valve prolapse, who presents to the ED on recommendation by Dr. Wiley (Nephrology) to be started on a heparin drip. Patient has a history of clotting disorder and is scheduled to have a fistulogram tomorrow due to high arterial pressure. She reports that her fistula is still being used for dialysis, and she last completed a full session of dialysis yesterday. Patient states she has been compliant with Warfarin (10 mg) for MVR. She reports that she has not taken Coumadin for a week because her INR level was 5.2 last week. Patient states she was evaluated by her cardiologist today, and her INR was 1.9. She denies history of DVT or PE. She has been at normal baseline the last few weeks. She has not been experiencing fevers, chills, headaches, dizziness, congestion, rhinorrhea, sore throat, cough, SOB, chest pain, abdominal pain, N/V/D, dysuria, urinary frequency, or urinary urgency. She has no complaints.       The history is provided by the patient.     Review of patient's allergies indicates:   Allergen Reactions    Amitriptyline      Causes severe headache.    Codeine      Past Medical History:   Diagnosis Date    Asthma     Cardiac arrhythmia     Clotting disorder     Diabetes mellitus     Hypertension     Kidney disease      Past Surgical History:   Procedure Laterality Date    APPENDECTOMY      CARDIAC VALVE REPLACEMENT      FISTULOGRAM Right 2/21/2019    Performed by [facility] MD at [facility] CATH LAB    OOPHORECTOMY       History reviewed. No pertinent family history.  Social History     Tobacco Use    Smoking  status: Never Smoker    Smokeless tobacco: Never Used   Substance Use Topics    Alcohol use: Not on file    Drug use: Not on file     Review of Systems   Constitutional: Negative for chills and fever.   HENT: Negative for congestion, rhinorrhea and sore throat.    Respiratory: Negative for cough and shortness of breath.    Cardiovascular: Negative for chest pain.   Gastrointestinal: Negative for abdominal pain, diarrhea, nausea and vomiting.   Genitourinary: Negative for dysuria, frequency and urgency.   Musculoskeletal: Negative for back pain.   Skin: Negative for rash.   Neurological: Negative for dizziness and headaches.   Psychiatric/Behavioral: Negative for confusion.       Physical Exam     Initial Vitals [02/26/19 2125]   BP Pulse Resp Temp SpO2   134/73 89 18 98.5 °F (36.9 °C) 99 %      MAP       --         Physical Exam    Nursing note and vitals reviewed.  Constitutional: She appears well-developed and well-nourished. No distress.   HENT:   Head: Normocephalic and atraumatic.   Mouth/Throat: Oropharynx is clear and moist.   Eyes: Conjunctivae and EOM are normal. Pupils are equal, round, and reactive to light.   Neck: Normal range of motion. Neck supple.   Cardiovascular: Normal rate, regular rhythm and intact distal pulses.   Murmur heard.   Systolic murmur is present.  Faint systolic murmur heard.    Pulmonary/Chest: No respiratory distress. She has no wheezes. She has no rhonchi. She has no rales.   Decreased breath sounds at bases.    Musculoskeletal: Normal range of motion. She exhibits edema.   Trace lower extremity edema. AV fistula present in RUE with positive thrill more prominent at distal end.    Neurological: She is alert and oriented to person, place, and time. She has normal strength. No cranial nerve deficit or sensory deficit.   Skin: Skin is warm and dry.   Psychiatric: She has a normal mood and affect. Her behavior is normal. Judgment and thought content normal.         ED Course    Procedures  Labs Reviewed   CBC W/ AUTO DIFFERENTIAL - Abnormal; Notable for the following components:       Result Value    RBC 3.50 (*)     Hemoglobin 9.5 (*)     Hematocrit 29.7 (*)     RDW 14.8 (*)     Gran% 75.9 (*)     Lymph% 14.9 (*)     All other components within normal limits   COMPREHENSIVE METABOLIC PANEL - Abnormal; Notable for the following components:    Glucose 185 (*)     BUN, Bld 45 (*)     Creatinine 3.3 (*)     Calcium 8.6 (*)     Albumin 2.7 (*)     eGFR if  15 (*)     eGFR if non  13 (*)     All other components within normal limits   PROTIME-INR - Abnormal; Notable for the following components:    Prothrombin Time 15.6 (*)     INR 1.4 (*)     All other components within normal limits   PHOSPHORUS - Abnormal; Notable for the following components:    Phosphorus 5.0 (*)     All other components within normal limits   APTT - Abnormal; Notable for the following components:    aPTT 38.2 (*)     All other components within normal limits   MAGNESIUM   APTT          Imaging Results          X-Ray Chest AP Portable (Final result)  Result time 02/26/19 22:38:03    Final result by Fernando López MD (02/26/19 22:38:03)                 Impression:      See above.      Electronically signed by: Fernando López MD  Date:    02/26/2019  Time:    22:38             Narrative:    EXAMINATION:  XR CHEST AP PORTABLE    CLINICAL HISTORY:  End stage renal disease    TECHNIQUE:  Single frontal view of the chest was performed.    COMPARISON:  None    FINDINGS:  Heart is enlarged.  Postsurgical changes with sternotomy wires and cardiac valve replacement are seen.  There is left-sided pacer device.  Increased interstitial attenuation is seen which may reflect chronic change versus mild pulmonary interstitial edema.  There is left retrocardiac opacity with obscuration of the left costophrenic angle.  This may reflect small left pleural effusion and/or mild left basilar  atelectasis/consolidation.  No evidence of pneumothorax.  No acute osseous abnormality identified.                              X-Rays:   Independently Interpreted Readings:   Chest X-Ray: Cardiomegaly present. MVR and pacemaker in place. No significant pulmonary edema.      Medical Decision Making:   Initial Assessment:       73-year-old female with history of ESRD, DM/HTN, s/p MVR on Coumadin, sent by Nephrology for heparin drip prior to fistulogram planned tomorrow.  She had right upper extremity fistula created last year, but Nephrology has noted increased arterial pressures during HD recently, so fistulogram .  Per chart review she has history of clotting disorder, but patient denies any personal history of known blood clot such as DVT/PE, and has been compliant with Coumadin for years s/p MVR.  She stopped taking Coumadin over week ago to get INR less than 2 in preparation for this procedure, and when checked today was 1.9, but she needs heparin drip to avoid any clotting complications due to MVR.  Patient is otherwise at normal baseline with no other complaints. She was last dialyzed yesterday and got full session, with no complaints of SOB and no signs of fluid overload currently.    Basic labs repeated with no hyperkalemia; chest x-ray read as possible mild interstitial edema versus chronic change, but patient has no symptoms to suggest pulmonary edema. Repeat INR 1.4. Discussed with Dr. Velasco, who is covering nephrology Dr. Wiley who will do fistulogram tomorrow, and heparin drip started in ED.  Patient admitted to hospitalist, NPO after midnight.    Clinical Tests:   Lab Tests: Ordered and Reviewed  Radiological Study: Ordered and Reviewed  Medical Tests: Ordered and Reviewed            Scribe Attestation:   Scribe #1: I performed the above scribed service and the documentation accurately describes the services I performed. I attest to the accuracy of the note.    Attending Attestation:            Physician Attestation for Scribe:  Physician Attestation Statement for Scribe #1: I, Dr. Palma, reviewed documentation, as scribed by Lars Gibson  in my presence, and it is both accurate and complete.                    Clinical Impression:     1. Inadequate flow of dialysis arteriovenous fistula, initial encounter    2. ESRD (end stage renal disease)                                   Luis Antonio Palma MD  02/27/19 0447

## 2019-02-27 NOTE — ASSESSMENT & PLAN NOTE
Coumadin held for fistulogram.  Heparin started.  After procedure can transition back to oral anticoagulation

## 2019-02-27 NOTE — SUBJECTIVE & OBJECTIVE
Past Medical History:   Diagnosis Date    Asthma     Cardiac arrhythmia     Clotting disorder     Diabetes mellitus     Hypertension     Kidney disease        Past Surgical History:   Procedure Laterality Date    APPENDECTOMY      CARDIAC VALVE REPLACEMENT      FISTULOGRAM Right 2/21/2019    Performed by Stan Wiley MD at [facility] CATH LAB    OOPHORECTOMY         Review of patient's allergies indicates:   Allergen Reactions    Amitriptyline      Causes severe headache.    Codeine        No current facility-administered medications on file prior to encounter.      Current Outpatient Medications on File Prior to Encounter   Medication Sig    calcium acetate (PHOSLO) 667 mg capsule Take 667 mg by mouth 3 (three) times daily with meals.    folic acid (FOLVITE) 800 MCG Tab Take 800 mcg by mouth once daily.    insulin glargine, TOUJEO, (TOUJEO) 300 unit/mL (1.5 mL) InPn pen Inject 12 Units into the skin every evening.     pravastatin (PRAVACHOL) 40 MG tablet Take 40 mg by mouth once daily.    SITagliptin (JANUVIA) 25 MG Tab Take 100 mg by mouth once daily.    torsemide (DEMADEX) 20 MG Tab     warfarin (COUMADIN) 10 MG tablet Take 10 mg by mouth once daily.    albuterol (ACCUNEB) 0.63 mg/3 mL Nebu Take 0.63 mg by nebulization every 6 (six) hours as needed. Rescue    amitriptyline (ELAVIL) 25 MG tablet 20 mg every evening.     butalbital-acetaminophen-caffeine -40 mg (FIORICET, ESGIC) -40 mg per tablet     gabapentin (NEURONTIN) 100 MG capsule Take 100 mg by mouth 2 (two) times daily.     meclizine (ANTIVERT) 25 mg tablet 3 (three) times daily as needed.     oxyCODONE-acetaminophen (PERCOCET)  mg per tablet Take 1 tablet by mouth every 4 (four) hours as needed for Pain.    promethazine (PHENERGAN) 12.5 MG Tab Take 12.5 mg by mouth every 6 (six) hours as needed.    topiramate (TOPAMAX) 100 MG tablet 100 mg.     zolpidem (AMBIEN) 10 mg Tab Take 5 mg by mouth nightly as needed.      Family History     None        Tobacco Use    Smoking status: Never Smoker    Smokeless tobacco: Never Used   Substance and Sexual Activity    Alcohol use: Not on file    Drug use: Not on file    Sexual activity: Not on file     Review of Systems   Constitutional: Negative for activity change, appetite change and fever.   HENT: Negative for congestion, ear pain, rhinorrhea and sinus pressure.    Eyes: Negative for pain and discharge.   Respiratory: Negative for cough, chest tightness, shortness of breath and wheezing.    Cardiovascular: Negative for chest pain and leg swelling.   Gastrointestinal: Negative for abdominal distention, abdominal pain, diarrhea, nausea and vomiting.   Endocrine: Negative for cold intolerance and heat intolerance.   Genitourinary: Negative for difficulty urinating, flank pain, frequency, hematuria and urgency.   Musculoskeletal: Negative for arthralgias, joint swelling and myalgias.   Allergic/Immunologic: Negative for environmental allergies and food allergies.   Neurological: Negative for dizziness, weakness, light-headedness and headaches.   Hematological: Does not bruise/bleed easily.   Psychiatric/Behavioral: Negative for agitation, behavioral problems and decreased concentration.     Objective:     Vital Signs (Most Recent):  Temp: 98.2 °F (36.8 °C) (02/27/19 0026)  Pulse: 74 (02/27/19 0015)  Resp: (!) 23 (02/27/19 0016)  BP: 120/69 (02/27/19 0015)  SpO2: 96 % (02/27/19 0016) Vital Signs (24h Range):  Temp:  [98.2 °F (36.8 °C)-98.5 °F (36.9 °C)] 98.2 °F (36.8 °C)  Pulse:  [74-89] 74  Resp:  [18-23] 23  SpO2:  [96 %-99 %] 96 %  BP: (116-134)/(66-73) 120/69     Weight: 68.5 kg (151 lb)  Body mass index is 25.92 kg/m².    Physical Exam   Constitutional: She is oriented to person, place, and time. She appears well-developed and well-nourished.   HENT:   Head: Normocephalic.   Eyes: Conjunctivae are normal. Right eye exhibits no discharge. Left eye exhibits no discharge.   Neck:  Normal range of motion. Neck supple.   Cardiovascular: Normal rate, regular rhythm, normal heart sounds and intact distal pulses.   Pulmonary/Chest: Effort normal and breath sounds normal. No respiratory distress.   Abdominal: Soft. Bowel sounds are normal. She exhibits no distension. There is no tenderness.   Musculoskeletal: Normal range of motion.   Neurological: She is alert and oriented to person, place, and time.   Skin: Skin is warm and dry.   Psychiatric: She has a normal mood and affect. Her behavior is normal. Thought content normal.           Significant Labs:   CBC:   Recent Labs   Lab 02/26/19  2237   WBC 7.05   HGB 9.5*   HCT 29.7*        CMP:   Recent Labs   Lab 02/26/19  2237      K 3.6      CO2 23   *   BUN 45*   CREATININE 3.3*   CALCIUM 8.6*   PROT 7.2   ALBUMIN 2.7*   BILITOT 1.0   ALKPHOS 124   AST 25   ALT 15   ANIONGAP 15   EGFRNONAA 13*       Significant Imaging: I have reviewed all pertinent imaging results/findings within the past 24 hours.

## 2019-02-27 NOTE — PLAN OF CARE
Problem: Adult Inpatient Plan of Care  Goal: Plan of Care Review  Outcome: Ongoing (interventions implemented as appropriate)  Patient's blood sugar 233 around 0100. Levemir insulin 10 units given in left arm per orders. Discussed with patient about nothing to eat or drink order due to fistulogram scheduled today. Patient verbalized understanding of instruction and has not had anything to eat or drink since arriving on the unit. Heparin drip infusing through left wrist 20 gauge IV access at 10.8 mL/hour. Call light within reach and bed alarm on. Encouraged patient to call for any and all needs. Patient verbalized understanding of instructions. Will continue to monitor patient.

## 2019-02-27 NOTE — PROGRESS NOTES
Pt bs reading of 62 noted and asymptomatic and charge nurse is pushing D50 per prn order.  Will continue to follow.

## 2019-02-27 NOTE — HPI
The patient is a 73 y.o. female, with history of ESRD, DM, HTN, and on chronic anticoagulation due to MVR for mitral valve prolapse, who presents to the ED on recommendation by Dr. Wiley (Nephrology) to be started on a heparin drip. Patient has a history of clotting disorder and is scheduled to have a fistulagram tomorrow due to high arterial pressure. She reports that her fistula is still being used for dialysis, and she last completed a full session of dialysis yesterday. Patient states she has been compliant with Warfarin (10 mg) for MVR. She reports that she has not taken Coumadin for a week because her INR level was 5.2 last week. Patient states she was evaluated by her cardiologist today, and her INR was 1.9. She denies history of DVT or PE. She has been at baseline the last few weeks. She has not been experiencing fevers, chills, headaches, dizziness, congestion, rhinorrhea, sore throat, cough, SOB, chest pain, abdominal pain, N/V/D, dysuria, urinary frequency, or urinary urgency. She has no complaints.

## 2019-02-28 LAB
ALBUMIN SERPL BCP-MCNC: 2.6 G/DL
ALP SERPL-CCNC: 125 U/L
ALT SERPL W/O P-5'-P-CCNC: 14 U/L
ANION GAP SERPL CALC-SCNC: 12 MMOL/L
APTT BLDCRRT: 49.1 SEC
APTT BLDCRRT: 60.4 SEC
APTT BLDCRRT: 61.5 SEC
AST SERPL-CCNC: 21 U/L
BASOPHILS # BLD AUTO: 0.04 K/UL
BASOPHILS NFR BLD: 0.6 %
BILIRUB SERPL-MCNC: 0.9 MG/DL
BUN SERPL-MCNC: 32 MG/DL
CALCIUM SERPL-MCNC: 8.8 MG/DL
CHLORIDE SERPL-SCNC: 97 MMOL/L
CO2 SERPL-SCNC: 27 MMOL/L
CREAT SERPL-MCNC: 2.5 MG/DL
DIFFERENTIAL METHOD: ABNORMAL
EOSINOPHIL # BLD AUTO: 0.1 K/UL
EOSINOPHIL NFR BLD: 0.9 %
ERYTHROCYTE [DISTWIDTH] IN BLOOD BY AUTOMATED COUNT: 14.7 %
EST. GFR  (AFRICAN AMERICAN): 21 ML/MIN/1.73 M^2
EST. GFR  (NON AFRICAN AMERICAN): 19 ML/MIN/1.73 M^2
GLUCOSE SERPL-MCNC: 116 MG/DL
HCT VFR BLD AUTO: 31.1 %
HGB BLD-MCNC: 9.8 G/DL
INR PPP: 1.1
INR PPP: 1.2
LYMPHOCYTES # BLD AUTO: 1.3 K/UL
LYMPHOCYTES NFR BLD: 18.7 %
MAGNESIUM SERPL-MCNC: 2 MG/DL
MCH RBC QN AUTO: 26.7 PG
MCHC RBC AUTO-ENTMCNC: 31.5 G/DL
MCV RBC AUTO: 85 FL
MONOCYTES # BLD AUTO: 0.8 K/UL
MONOCYTES NFR BLD: 12 %
NEUTROPHILS # BLD AUTO: 4.5 K/UL
NEUTROPHILS NFR BLD: 67.4 %
PHOSPHATE SERPL-MCNC: 4.3 MG/DL
PLATELET # BLD AUTO: 304 K/UL
PMV BLD AUTO: 9.5 FL
POCT GLUCOSE: 188 MG/DL (ref 70–110)
POCT GLUCOSE: 231 MG/DL (ref 70–110)
POTASSIUM SERPL-SCNC: 3.7 MMOL/L
PROT SERPL-MCNC: 6.5 G/DL
PROTHROMBIN TIME: 12.7 SEC
PROTHROMBIN TIME: 13.2 SEC
RBC # BLD AUTO: 3.67 M/UL
SODIUM SERPL-SCNC: 136 MMOL/L
WBC # BLD AUTO: 6.68 K/UL

## 2019-02-28 PROCEDURE — 85730 THROMBOPLASTIN TIME PARTIAL: CPT | Mod: 91

## 2019-02-28 PROCEDURE — 25000003 PHARM REV CODE 250: Performed by: NURSE PRACTITIONER

## 2019-02-28 PROCEDURE — C1725 CATH, TRANSLUMIN NON-LASER: HCPCS | Performed by: INTERNAL MEDICINE

## 2019-02-28 PROCEDURE — 99226 PR SUBSEQUENT OBSERVATION CARE,LEVEL III: ICD-10-PCS | Mod: ,,, | Performed by: INTERNAL MEDICINE

## 2019-02-28 PROCEDURE — 99152 MOD SED SAME PHYS/QHP 5/>YRS: CPT | Performed by: INTERNAL MEDICINE

## 2019-02-28 PROCEDURE — 85730 THROMBOPLASTIN TIME PARTIAL: CPT

## 2019-02-28 PROCEDURE — 63600175 PHARM REV CODE 636 W HCPCS: Performed by: EMERGENCY MEDICINE

## 2019-02-28 PROCEDURE — G0378 HOSPITAL OBSERVATION PER HR: HCPCS

## 2019-02-28 PROCEDURE — 83735 ASSAY OF MAGNESIUM: CPT

## 2019-02-28 PROCEDURE — 63600175 PHARM REV CODE 636 W HCPCS: Performed by: INTERNAL MEDICINE

## 2019-02-28 PROCEDURE — 25500020 PHARM REV CODE 255: Performed by: INTERNAL MEDICINE

## 2019-02-28 PROCEDURE — 25000003 PHARM REV CODE 250: Performed by: INTERNAL MEDICINE

## 2019-02-28 PROCEDURE — 84100 ASSAY OF PHOSPHORUS: CPT

## 2019-02-28 PROCEDURE — C1769 GUIDE WIRE: HCPCS | Performed by: INTERNAL MEDICINE

## 2019-02-28 PROCEDURE — 36902 INTRO CATH DIALYSIS CIRCUIT: CPT | Performed by: INTERNAL MEDICINE

## 2019-02-28 PROCEDURE — 85025 COMPLETE CBC W/AUTO DIFF WBC: CPT

## 2019-02-28 PROCEDURE — 99153 MOD SED SAME PHYS/QHP EA: CPT | Performed by: INTERNAL MEDICINE

## 2019-02-28 PROCEDURE — 80053 COMPREHEN METABOLIC PANEL: CPT

## 2019-02-28 PROCEDURE — 99226 PR SUBSEQUENT OBSERVATION CARE,LEVEL III: CPT | Mod: ,,, | Performed by: INTERNAL MEDICINE

## 2019-02-28 PROCEDURE — 36415 COLL VENOUS BLD VENIPUNCTURE: CPT

## 2019-02-28 PROCEDURE — 99900035 HC TECH TIME PER 15 MIN (STAT)

## 2019-02-28 PROCEDURE — 94761 N-INVAS EAR/PLS OXIMETRY MLT: CPT

## 2019-02-28 PROCEDURE — C1894 INTRO/SHEATH, NON-LASER: HCPCS | Performed by: INTERNAL MEDICINE

## 2019-02-28 PROCEDURE — 85610 PROTHROMBIN TIME: CPT | Mod: 91

## 2019-02-28 PROCEDURE — 85610 PROTHROMBIN TIME: CPT

## 2019-02-28 RX ORDER — FENTANYL CITRATE 50 UG/ML
INJECTION, SOLUTION INTRAMUSCULAR; INTRAVENOUS
Status: DISCONTINUED | OUTPATIENT
Start: 2019-02-28 | End: 2019-02-28 | Stop reason: HOSPADM

## 2019-02-28 RX ORDER — LIDOCAINE HYDROCHLORIDE 10 MG/ML
INJECTION INFILTRATION; PERINEURAL
Status: DISCONTINUED | OUTPATIENT
Start: 2019-02-28 | End: 2019-02-28 | Stop reason: HOSPADM

## 2019-02-28 RX ORDER — WARFARIN SODIUM 5 MG/1
10 TABLET ORAL DAILY
Status: DISCONTINUED | OUTPATIENT
Start: 2019-02-28 | End: 2019-03-01

## 2019-02-28 RX ORDER — MIDAZOLAM HYDROCHLORIDE 1 MG/ML
INJECTION, SOLUTION INTRAMUSCULAR; INTRAVENOUS
Status: DISCONTINUED | OUTPATIENT
Start: 2019-02-28 | End: 2019-02-28 | Stop reason: HOSPADM

## 2019-02-28 RX ORDER — HEPARIN SOD,PORCINE/0.9 % NACL 1000/500ML
INTRAVENOUS SOLUTION INTRAVENOUS
Status: DISCONTINUED | OUTPATIENT
Start: 2019-02-28 | End: 2019-02-28 | Stop reason: HOSPADM

## 2019-02-28 RX ORDER — PRAVASTATIN SODIUM 20 MG/1
40 TABLET ORAL NIGHTLY
Status: DISCONTINUED | OUTPATIENT
Start: 2019-02-28 | End: 2019-03-03 | Stop reason: HOSPADM

## 2019-02-28 RX ADMIN — CALCIUM ACETATE 667 MG: 667 CAPSULE ORAL at 05:02

## 2019-02-28 RX ADMIN — GABAPENTIN 100 MG: 100 CAPSULE ORAL at 11:02

## 2019-02-28 RX ADMIN — ACETAMINOPHEN 650 MG: 325 TABLET, FILM COATED ORAL at 10:02

## 2019-02-28 RX ADMIN — WARFARIN SODIUM 10 MG: 5 TABLET ORAL at 05:02

## 2019-02-28 RX ADMIN — FOLIC ACID 1 MG: 1 TABLET ORAL at 11:02

## 2019-02-28 RX ADMIN — GABAPENTIN 100 MG: 100 CAPSULE ORAL at 09:02

## 2019-02-28 RX ADMIN — HEPARIN SODIUM 18 UNITS/KG/HR: 10000 INJECTION, SOLUTION INTRAVENOUS at 10:02

## 2019-02-28 RX ADMIN — CALCIUM ACETATE 667 MG: 667 CAPSULE ORAL at 11:02

## 2019-02-28 RX ADMIN — HEPARIN SODIUM 18 UNITS/KG/HR: 10000 INJECTION, SOLUTION INTRAVENOUS at 01:02

## 2019-02-28 RX ADMIN — PRAVASTATIN SODIUM 40 MG: 20 TABLET ORAL at 09:02

## 2019-02-28 RX ADMIN — TORSEMIDE 20 MG: 10 TABLET ORAL at 11:02

## 2019-02-28 NOTE — H&P
[facility], [place] Cath Lab [address]  History & Physical    Subjective:      Chief Complaint/Reason for Admission: Here for fistulogram    Jose Francisco Martines is a 73 y.o. female with ESRD (MWF at [facility] with Dr. Guillen) who presented last week for fistulogram after they noted downtrending adequacy and high arterial pressures, was noted to have a supratherapeutic INR, then was admitted after holding her coumadin for a heparin gtt.  The heparin was stopped this morning.    Past Medical History:   Diagnosis Date    Asthma     Cardiac arrhythmia     Clotting disorder     Diabetes mellitus     End stage renal failure on dialysis     Hypertension      Past Surgical History:   Procedure Laterality Date    APPENDECTOMY      CARDIAC VALVE REPLACEMENT      FISTULOGRAM Right 2/21/2019    Performed by Stan [facility] MD at [facility] CATH LAB    OOPHORECTOMY       History reviewed. No pertinent family history.  Social History     Tobacco Use    Smoking status: Never Smoker    Smokeless tobacco: Never Used   Substance Use Topics    Alcohol use: Not on file    Drug use: Not on file       PTA Medications   Medication Sig    calcium acetate (PHOSLO) 667 mg capsule Take 667 mg by mouth 3 (three) times daily with meals.    folic acid (FOLVITE) 800 MCG Tab Take 800 mcg by mouth once daily.    insulin glargine, TOUJEO, (TOUJEO) 300 unit/mL (1.5 mL) InPn pen Inject 12 Units into the skin every evening.     pravastatin (PRAVACHOL) 40 MG tablet Take 40 mg by mouth once daily.    SITagliptin (JANUVIA) 25 MG Tab Take 100 mg by mouth once daily.    torsemide (DEMADEX) 20 MG Tab     warfarin (COUMADIN) 10 MG tablet Take 10 mg by mouth once daily.    albuterol (ACCUNEB) 0.63 mg/3 mL Nebu Take 0.63 mg by nebulization every 6 (six) hours as needed. Rescue    amitriptyline (ELAVIL) 25 MG tablet 20 mg every evening.     butalbital-acetaminophen-caffeine -40 mg (FIORICET, ESGIC) -40 mg per tablet     gabapentin  (NEURONTIN) 100 MG capsule Take 100 mg by mouth 2 (two) times daily.     meclizine (ANTIVERT) 25 mg tablet 3 (three) times daily as needed.     oxyCODONE-acetaminophen (PERCOCET)  mg per tablet Take 1 tablet by mouth every 4 (four) hours as needed for Pain.    promethazine (PHENERGAN) 12.5 MG Tab Take 12.5 mg by mouth every 6 (six) hours as needed.    topiramate (TOPAMAX) 100 MG tablet 100 mg.     zolpidem (AMBIEN) 10 mg Tab Take 5 mg by mouth nightly as needed.     Review of patient's allergies indicates:   Allergen Reactions    Amitriptyline      Causes severe headache.    Codeine         Review of Systems   Constitutional: Negative.    Respiratory: Negative.    Cardiovascular: Negative.        Objective:      Vital Signs (Most Recent)  Temp: 97.4 °F (36.3 °C) (02/28/19 0720)  Pulse: 76 (02/28/19 0720)  Resp: 18 (02/28/19 0720)  BP: 114/67 (02/28/19 0720)  SpO2: 99 % (02/28/19 0720)    Vital Signs Range (Last 24H):  Temp:  [97.4 °F (36.3 °C)-98.5 °F (36.9 °C)]   Pulse:  [60-87]   Resp:  [18-20]   BP: (110-122)/(64-84)   SpO2:  [97 %-100 %]     Physical Exam   Constitutional: She is oriented to person, place, and time. She appears well-developed and well-nourished. No distress.   HENT:   Head: Normocephalic and atraumatic.   Eyes: EOM are normal.   Neck: Neck supple.   Pulmonary/Chest: Effort normal. No respiratory distress.   Musculoskeletal:   RUE brachiobasilic AVF with soft thrill, systolic.  Mild pulsatility, appropriate pulse augmentation   Neurological: She is alert and oriented to person, place, and time.   Skin: Skin is warm and dry. She is not diaphoretic.   Psychiatric: She has a normal mood and affect. Her behavior is normal.       Assessment:      Active Hospital Problems    Diagnosis  POA    *Inadequate flow of hemodialysis AV fistula [T82.288A]  Yes    H/O prosthetic mitral valve [Z95.2]  Not Applicable    Type 2 diabetes mellitus with kidney complication, with long-term current use  of insulin [E11.29, Z79.4]  Not Applicable    ESRD (end stage renal disease) [N18.6]  Yes      Resolved Hospital Problems   No resolved problems to display.       Plan:      Fistulogram today.  Risks explained, consent signed.

## 2019-02-28 NOTE — NURSING
Plan of care reviewed with patient, medications explained. Pt currently resting comfortably in bed and appears to be sleeping in between care. VSS, bed in lowest, locked position with call light in reach. Purposeful rounding done.  No new needs identified at this time, will continue to monitor. Pt has remained NPO for Fistulogram this am.

## 2019-02-28 NOTE — BRIEF OP NOTE
[facility] Cath Lab [address]  Brief Operative Note    SUMMARY     Surgery Date: 2/28/2019     Surgeon(s) and Role:     * Stan Wiley MD - Primary    Assisting Surgeon: None    Pre-op Diagnosis:  ESRD (end stage renal disease) on dialysis [N18.6, Z99.2]  Complication of vascular access for dialysis, subsequent encounter [T82.9XXD]    Post-op Diagnosis:  Post-Op Diagnosis Codes:     * ESRD (end stage renal disease) on dialysis [N18.6, Z99.2]     * Complication of vascular access for dialysis, subsequent encounter [T82.9XXD]    Procedure(s) (LRB):  FISTULOGRAM (N/A)    Anesthesia: 1% lidocaine, 1 mg versed, 50 mcg fentanyl    Description of Procedure: Patient was prepped and draped in a sterile fashion.  This was a successful fistulogram of her RUE brachiobasilic AV fistula with angioplasty performed in the JA portion of the fistula as well as the swing segment.  Patient tolerated the procedure well and no immediate complications noted.      Description of the findings of the procedure: Patient was prepped and draped in a sterile fashion.  This was a successful fistulogram of her RUE brachiobasilic AV fistula with angioplasty performed in the JA portion of the fistula as well as the swing segment.  Patient tolerated the procedure well and no immediate complications noted.      Estimated Blood Loss: < 10 mL         Specimens:   Specimen (12h ago, onward)    None

## 2019-02-28 NOTE — PROGRESS NOTES
"Nephrology  Progress Note    Admit Date: 2/26/2019   LOS: 0 days     SUBJECTIVE:     Follow-up For:  Inadequate flow of hemodialysis AV fistula    Interval History:     Seen in cath-lab during fistulogram.   Discussed with team.     Review of Systems:  Constitutional: No fever or chills  Respiratory: No cough or shortness of breath  Cardiovascular: No chest pain or palpitations  Gastrointestinal: No nausea or vomiting  Neurological: No confusion or weakness    OBJECTIVE:     Vital Signs Range (Last 24H):  /65   Pulse 76   Temp 97.4 °F (36.3 °C) (Oral)   Resp 19   Ht 5' 4" (1.626 m)   Wt 59.3 kg (130 lb 11.7 oz)   SpO2 98%   Breastfeeding? No   BMI 22.44 kg/m²     Temp:  [97.4 °F (36.3 °C)-98.5 °F (36.9 °C)]   Pulse:  [60-87]   Resp:  [18-20]   BP: (105-126)/(64-81)   SpO2:  [97 %-100 %]     I & O (Last 24H):    Intake/Output Summary (Last 24 hours) at 2/28/2019 1031  Last data filed at 2/27/2019 1300  Gross per 24 hour   Intake 500 ml   Output 1500 ml   Net -1000 ml       Physical Exam:  General appearance: Well developed, well nourished  Eyes:  Conjunctivae/corneas clear. PERRL.  Lungs: Normal respiratory effort,   clear to auscultation bilaterally   Heart: Regular rate and rhythm, S1, S2 normal, no murmur, rub or roopa. +valve click  Abdomen: Soft, non-tender non-distended; bowel sounds normal; no masses,  no organomegaly  Extremities: No cyanosis or clubbing. No edema.    Skin: Skin color, texture, turgor normal. No rashes or lesions  Neurologic: Normal strength and tone. No focal numbness or weakness   Right arm AVF      Laboratory Data:  Recent Labs   Lab 02/28/19  0554   WBC 6.68   RBC 3.67*   HGB 9.8*   HCT 31.1*      MCV 85   MCH 26.7*   MCHC 31.5*       BMP:   Recent Labs   Lab 02/28/19  0554   *      K 3.7   CL 97   CO2 27   BUN 32*   CREATININE 2.5*   CALCIUM 8.8   MG 2.0     Lab Results   Component Value Date    CALCIUM 8.8 02/28/2019    PHOS 4.3 02/28/2019       Lab " Results   Component Value Date    CALCIUM 8.8 02/28/2019    PHOS 4.3 02/28/2019       No results found for: URICACID    BNP  No results for input(s): BNP, BNPTRIAGEBLO in the last 168 hours.    Medications:  Medication list was reviewed and changes noted under Assessment/Plan.    Diagnostic Results:        ASSESSMENT/PLAN:     1. End-stage renal disease on hemodialysis Monday Wednesday and Fridays at [facility] (N18.6, Z 99.2):  Followed by Dr. Mcneill at [facility] 2nd shift.  Consent signed.  Renally dose meds, avoid nephrotoxins, and monitor I/O's closely.  2. AVF Malfxn (T82.893U):  Heparin drip prior to fistulogram.  Has had declining Kt/V in recent months.  F/U fistulogram s/p ballooning of stenosis.  Thanks Dr. Wiley.  3. Prosthetic mitral valve on Coumadin (Z95.2):  Followed by Dr. Cuevas. Resume coumadin following fistulogram today.    4. Anemia of CKD (D63.1):  epo on HD.   5. DM (E11.65):  Resume routine insulin.      See above  Will follow along.

## 2019-02-28 NOTE — PROCEDURES
[facility] Interventional Nephrology Service    Fistulogram Procedure Report    Date of Procedure:  02/28/2019 10:41 AM    Procedures Performed: Fistulogram with venous angioplasty of the juxta-anastomosis region as well as the swing segment of the outflow, reflux arteriogram    Indication: downtrending adequacy and low arterial pressures    Referring Provider: Dr. Guillen and CANDIE    Primary Surgeon: Stan Wiley MD  Assist: none    Medications Administered:  1% lidocaine, 1 mg versed, 50 mcg fentanyl    Procedure Report in Detail:  After informed consent was obtained the patient was prepped and draped in the usual sterile fashion.  Her right upper brachiobasilic AVF was cannulated in the venous facing direction with a micropuncture system, confirmed in correct placement under fluroscopy, and a fistulogram was performed using iodinated contrast.  Fistulogram revealed significant stenosis (>90%) at the swing segment, with patent fistula otherwise.  Central vasculature was then evaluated with contrasted film revealing widely patent central vasculature.  Reflux arteriogram revealed significant (>90%) stenosis in the juxta-anastomosis region.  More than 6cm of the brachial artery was visualized and it was patent.  The micropuncture sheath was cannulated with a glidewire which was advanced to the central circulation, and the micropuncture sheath was removed.  A 6 Fr sheath was inserted over the glidewire, and the following interventions were performed after the patient was given 1 mg versed and 50 mcg fentanyl: using a 6 x 40 mm conquest pta balloon and endeflator, we advanced the balloon to the swing segment and inflated to full effacement with waist seen on balloon prior to full effacement.  This was held fully effaced for 2 minutes, after which follow up film revealed resolution of the stenosis with no evidence of extravasation.  The sheath was pulled, and direct pressure was used to achieve hemostasis.      We then  opted to perform angioplasty of the juxta-anastomosis stenosis so the micropuncture kit was used to cannulate in the arterial-facing direction.  Our wire was confirmed to be in placement under fluoroscopy.  We had difficulty passing the glidewire past the JA stenosis, so a berenstein catheter was used to guide the wire past the stenosis and into the artery.  We then used a 5 x 80 mm mustang pta balloon and the same endeflator, advanced the balloon to the JA stenosis and inflated to full effacement with waist seen prior to full effacement.  At this point, the thrill in the fistula was much stronger and there was better flow of blood through the sheath.  So we pulled the sheath and a #2 ethilon suture was used to achieve hemostasis.  No complications occurred during the procedure.     Estimated blood loss: < 10 mL   Estimated contrast used: 15 mL    Impression/Plan:  This was a successful fistulogram with angioplasty performed in the swing segment as well as the juxta-anastomosis region of the fistula.  We will have the patient return in 2 week for suture removal and follow up exam.  Please do refer the patient back if there are any further signs of stenosis (increased venous pressure alarms, prolonged bleeding or worsening adequacy).      Stan Wiley MD  [phone]

## 2019-02-28 NOTE — NURSING
Per Nephrology order, will hold heparin drip 1 hour before procedure. Will restart heparin drip when pt resturns to room and adjust aPPT lab draw time accordingly. Will continue to monitor.

## 2019-03-01 LAB
ALBUMIN SERPL BCP-MCNC: 2.6 G/DL
ALP SERPL-CCNC: 122 U/L
ALT SERPL W/O P-5'-P-CCNC: 10 U/L
ANION GAP SERPL CALC-SCNC: 12 MMOL/L
APTT BLDCRRT: 63.4 SEC
APTT BLDCRRT: 63.4 SEC
APTT BLDCRRT: 71.3 SEC
APTT BLDCRRT: 72 SEC
AST SERPL-CCNC: 19 U/L
BASOPHILS # BLD AUTO: 0.03 K/UL
BASOPHILS NFR BLD: 0.4 %
BILIRUB SERPL-MCNC: 0.8 MG/DL
BUN SERPL-MCNC: 41 MG/DL
CALCIUM SERPL-MCNC: 8.9 MG/DL
CHLORIDE SERPL-SCNC: 96 MMOL/L
CO2 SERPL-SCNC: 26 MMOL/L
CREAT SERPL-MCNC: 3.1 MG/DL
DIFFERENTIAL METHOD: ABNORMAL
EOSINOPHIL # BLD AUTO: 0.1 K/UL
EOSINOPHIL NFR BLD: 1.3 %
ERYTHROCYTE [DISTWIDTH] IN BLOOD BY AUTOMATED COUNT: 14.8 %
EST. GFR  (AFRICAN AMERICAN): 17 ML/MIN/1.73 M^2
EST. GFR  (NON AFRICAN AMERICAN): 14 ML/MIN/1.73 M^2
GLUCOSE SERPL-MCNC: 146 MG/DL
HCT VFR BLD AUTO: 29.4 %
HGB BLD-MCNC: 9.3 G/DL
INR PPP: 1.1
LYMPHOCYTES # BLD AUTO: 1.3 K/UL
LYMPHOCYTES NFR BLD: 16.8 %
MAGNESIUM SERPL-MCNC: 2 MG/DL
MCH RBC QN AUTO: 26.6 PG
MCHC RBC AUTO-ENTMCNC: 31.6 G/DL
MCV RBC AUTO: 84 FL
MONOCYTES # BLD AUTO: 0.8 K/UL
MONOCYTES NFR BLD: 9.9 %
NEUTROPHILS # BLD AUTO: 5.6 K/UL
NEUTROPHILS NFR BLD: 71.1 %
PHOSPHATE SERPL-MCNC: 4.5 MG/DL
PLATELET # BLD AUTO: 290 K/UL
PMV BLD AUTO: 9.3 FL
POCT GLUCOSE: 100 MG/DL (ref 70–110)
POCT GLUCOSE: 126 MG/DL (ref 70–110)
POCT GLUCOSE: 180 MG/DL (ref 70–110)
POCT GLUCOSE: 242 MG/DL (ref 70–110)
POTASSIUM SERPL-SCNC: 4.1 MMOL/L
PROT SERPL-MCNC: 6.3 G/DL
PROTHROMBIN TIME: 12.9 SEC
RBC # BLD AUTO: 3.49 M/UL
SODIUM SERPL-SCNC: 134 MMOL/L
WBC # BLD AUTO: 7.91 K/UL

## 2019-03-01 PROCEDURE — 25000003 PHARM REV CODE 250: Performed by: NURSE PRACTITIONER

## 2019-03-01 PROCEDURE — 94761 N-INVAS EAR/PLS OXIMETRY MLT: CPT

## 2019-03-01 PROCEDURE — 85730 THROMBOPLASTIN TIME PARTIAL: CPT

## 2019-03-01 PROCEDURE — 85025 COMPLETE CBC W/AUTO DIFF WBC: CPT

## 2019-03-01 PROCEDURE — 85730 THROMBOPLASTIN TIME PARTIAL: CPT | Mod: 91

## 2019-03-01 PROCEDURE — 99233 PR SUBSEQUENT HOSPITAL CARE,LEVL III: ICD-10-PCS | Mod: ,,, | Performed by: INTERNAL MEDICINE

## 2019-03-01 PROCEDURE — 25000003 PHARM REV CODE 250: Performed by: INTERNAL MEDICINE

## 2019-03-01 PROCEDURE — 11000001 HC ACUTE MED/SURG PRIVATE ROOM

## 2019-03-01 PROCEDURE — 83735 ASSAY OF MAGNESIUM: CPT

## 2019-03-01 PROCEDURE — 84100 ASSAY OF PHOSPHORUS: CPT

## 2019-03-01 PROCEDURE — 36415 COLL VENOUS BLD VENIPUNCTURE: CPT

## 2019-03-01 PROCEDURE — 80053 COMPREHEN METABOLIC PANEL: CPT

## 2019-03-01 PROCEDURE — 99233 SBSQ HOSP IP/OBS HIGH 50: CPT | Mod: ,,, | Performed by: INTERNAL MEDICINE

## 2019-03-01 PROCEDURE — 63600175 PHARM REV CODE 636 W HCPCS: Performed by: NURSE PRACTITIONER

## 2019-03-01 PROCEDURE — 80100016 HC MAINTENANCE HEMODIALYSIS

## 2019-03-01 PROCEDURE — 99900035 HC TECH TIME PER 15 MIN (STAT)

## 2019-03-01 PROCEDURE — 85610 PROTHROMBIN TIME: CPT

## 2019-03-01 PROCEDURE — 63600175 PHARM REV CODE 636 W HCPCS: Performed by: EMERGENCY MEDICINE

## 2019-03-01 RX ORDER — WARFARIN SODIUM 5 MG/1
15 TABLET ORAL DAILY
Status: DISCONTINUED | OUTPATIENT
Start: 2019-03-01 | End: 2019-03-03 | Stop reason: HOSPADM

## 2019-03-01 RX ORDER — BUTALBITAL, ACETAMINOPHEN AND CAFFEINE 50; 325; 40 MG/1; MG/1; MG/1
1 TABLET ORAL EVERY 6 HOURS PRN
Status: DISCONTINUED | OUTPATIENT
Start: 2019-03-01 | End: 2019-03-03 | Stop reason: HOSPADM

## 2019-03-01 RX ADMIN — PRAVASTATIN SODIUM 40 MG: 20 TABLET ORAL at 09:03

## 2019-03-01 RX ADMIN — GABAPENTIN 100 MG: 100 CAPSULE ORAL at 10:03

## 2019-03-01 RX ADMIN — HEPARIN SODIUM 18 UNITS/KG/HR: 10000 INJECTION, SOLUTION INTRAVENOUS at 12:03

## 2019-03-01 RX ADMIN — WARFARIN SODIUM 15 MG: 5 TABLET ORAL at 05:03

## 2019-03-01 RX ADMIN — INSULIN ASPART 2 UNITS: 100 INJECTION, SOLUTION INTRAVENOUS; SUBCUTANEOUS at 07:03

## 2019-03-01 RX ADMIN — CALCIUM ACETATE 667 MG: 667 CAPSULE ORAL at 05:03

## 2019-03-01 RX ADMIN — FOLIC ACID 1 MG: 1 TABLET ORAL at 10:03

## 2019-03-01 RX ADMIN — BUTALBITAL, ACETAMINOPHEN AND CAFFEINE 1 TABLET: 50; 325; 40 TABLET ORAL at 02:03

## 2019-03-01 RX ADMIN — BUTALBITAL, ACETAMINOPHEN AND CAFFEINE 1 TABLET: 50; 325; 40 TABLET ORAL at 09:03

## 2019-03-01 RX ADMIN — HEPARIN SODIUM 16 UNITS/KG/HR: 10000 INJECTION, SOLUTION INTRAVENOUS at 01:03

## 2019-03-01 RX ADMIN — GABAPENTIN 100 MG: 100 CAPSULE ORAL at 09:03

## 2019-03-01 RX ADMIN — ONDANSETRON 8 MG: 8 TABLET, ORALLY DISINTEGRATING ORAL at 08:03

## 2019-03-01 NOTE — PROGRESS NOTES
[facility] Medicine  Progress Note    Patient Name: Jose Francisco Martines  MRN: [id number]  Patient Class: IP- Inpatient   Admission Date: 2/26/2019  Length of Stay: 0 days  Attending Physician: Raysa Francisco, *  Primary Care Provider: Yobani Wang MD        Subjective:     Principal Problem:Inadequate flow of hemodialysis AV fistula    HPI:  The patient is a 73 y.o. female, with history of ESRD, DM, HTN, and on chronic anticoagulation due to MVR for mitral valve prolapse, who presents to the ED on recommendation by Dr. Wiley (Nephrology) to be started on a heparin drip. Patient has a history of clotting disorder and is scheduled to have a fistulagram tomorrow due to high arterial pressure. She reports that her fistula is still being used for dialysis, and she last completed a full session of dialysis yesterday. Patient states she has been compliant with Warfarin (10 mg) for MVR. She reports that she has not taken Coumadin for a week because her INR level was 5.2 last week. Patient states she was evaluated by her cardiologist today, and her INR was 1.9. She denies history of DVT or PE. She has been at baseline the last few weeks. She has not been experiencing fevers, chills, headaches, dizziness, congestion, rhinorrhea, sore throat, cough, SOB, chest pain, abdominal pain, N/V/D, dysuria, urinary frequency, or urinary urgency. She has no complaints.         Hospital Course:  Fistulogram performed today and patient tolerated procedure well.    Interval History: No complaints this morning, just eager to go home.  Evaluated patient while on dialysis.  Discussed plan of care and patient in agreement.     Review of Systems   Constitutional: Negative for chills and fever.   Respiratory: Negative for chest tightness and shortness of breath.    Cardiovascular: Negative for chest pain, palpitations and leg swelling.   Gastrointestinal: Negative for abdominal pain and blood in stool.    Genitourinary: Negative for dysuria, flank pain and frequency.   Musculoskeletal: Negative for arthralgias, back pain and joint swelling.   Neurological: Negative for seizures and syncope.   Hematological: Does not bruise/bleed easily.   All other systems reviewed and are negative.    Objective:     Vital Signs (Most Recent):  Temp: 98.9 °F (37.2 °C) (03/01/19 1512)  Pulse: 88 (03/01/19 1512)  Resp: 18 (03/01/19 1512)  BP: 113/64 (03/01/19 1512)  SpO2: 96 % (03/01/19 1512) Vital Signs (24h Range):  Temp:  [97.9 °F (36.6 °C)-98.9 °F (37.2 °C)] 98.9 °F (37.2 °C)  Pulse:  [80-97] 88  Resp:  [16-18] 18  SpO2:  [96 %-100 %] 96 %  BP: (111-132)/(63-85) 113/64     Weight: 59.3 kg (130 lb 11.7 oz)  Body mass index is 22.44 kg/m².    Intake/Output Summary (Last 24 hours) at 3/1/2019 1745  Last data filed at 3/1/2019 1215  Gross per 24 hour   Intake --   Output 1500 ml   Net -1500 ml      Physical Exam   Constitutional: She is oriented to person, place, and time. She appears well-developed and well-nourished. No distress.   HENT:   Head: Normocephalic and atraumatic.   Eyes: Conjunctivae are normal.   Cardiovascular: Regular rhythm, normal heart sounds and intact distal pulses. Exam reveals no gallop.   No murmur heard.  Pulmonary/Chest: Effort normal and breath sounds normal. No stridor. She has no wheezes.   Abdominal: Soft. Bowel sounds are normal. She exhibits no distension. There is no tenderness.   Neurological: She is alert and oriented to person, place, and time.   Skin: Skin is warm and dry. She is not diaphoretic. No erythema.   Psychiatric: She has a normal mood and affect.   Nursing note and vitals reviewed.      Significant Labs:   CBC:   Recent Labs   Lab 02/28/19  0554 03/01/19  0523   WBC 6.68 7.91   HGB 9.8* 9.3*   HCT 31.1* 29.4*    290     CMP:   Recent Labs   Lab 02/28/19  0554 03/01/19  0523    134*   K 3.7 4.1   CL 97 96   CO2 27 26   * 146*   BUN 32* 41*   CREATININE 2.5* 3.1*    CALCIUM 8.8 8.9   PROT 6.5 6.3   ALBUMIN 2.6* 2.6*   BILITOT 0.9 0.8   ALKPHOS 125 122   AST 21 19   ALT 14 10   ANIONGAP 12 12   EGFRNONAA 19* 14*     Coagulation:   Recent Labs   Lab 03/01/19  0523 03/01/19  1156   INR 1.1  --    APTT 63.4*  63.4* 72.0*     All pertinent labs within the past 24 hours have been reviewed.    Significant Imaging: I have reviewed all pertinent imaging results/findings within the past 24 hours.    Assessment/Plan:      * Inadequate flow of hemodialysis AV fistula    Fistula now functional s/p fistulogram  HD today  RESOLVED       Type 2 diabetes mellitus with kidney complication, with long-term current use of insulin    Continue with SSI  Random glucose   116  146       POCT Glucose  80 173 231 188 242           H/O prosthetic mitral valve    Coumadin held for fistulogram.  Continue Heparin.  increase oral anticoagulation to 15 mg this evening  Will discharge when INR goal >2       ESRD (end stage renal disease)    Consulted Nephrology, appreciate recs  HD today  Will resume home schedule upon discharge MWF       VTE Risk Mitigation (From admission, onward)        Ordered     warfarin (COUMADIN) tablet 15 mg  Daily      03/01/19 1656     Place sequential compression device  Until discontinued      02/27/19 0104     IP VTE HIGH RISK PATIENT  Once      02/27/19 0104     Reason for No Pharmacological VTE Prophylaxis  Once      02/27/19 0104     Place sequential compression device  Until discontinued      02/27/19 0104     heparin 25,000 units in dextrose 5% 250 mL (100 units/mL) infusion HIGH INTENSITY nomogram - [facility]  Continuous      02/26/19 2236     heparin 25,000 units in dextrose 5% (100 units/ml) IV bolus from bag - ADDITIONAL PRN BOLUS - 60 units/kg  As needed (PRN)      02/26/19 2236     heparin 25,000 units in dextrose 5% (100 units/ml) IV bolus from bag - ADDITIONAL PRN BOLUS - 30 units/kg  As needed (PRN)      02/26/19 2236              Raysa Francisco MD  Department  of [facility] Medicine   [facility]

## 2019-03-01 NOTE — PLAN OF CARE
Problem: Adult Inpatient Plan of Care  Goal: Plan of Care Review  Outcome: Ongoing (interventions implemented as appropriate)  Pt remained free of falls and injuries. IV flushed and infusing. Heparin titrated to 9.6 after aPTT of 72.0. Lab to redraw aPTT at 2000. VSS on room air. Ambulates without assistance. Managed migraines with PRN medications. Bed low and locked, call light within reach, side rails up X2. Will continue to monitor.

## 2019-03-01 NOTE — PROGRESS NOTES
"Nephrology  Progress Note    Admit Date: 2/26/2019   LOS: 0 days     SUBJECTIVE:     Follow-up For:  Inadequate flow of hemodialysis AV fistula    Interval History:     Seen on HD.  C/o migraine HA.  No CP/SOB.      Review of Systems:  Constitutional: No fever or chills  Respiratory: No cough or shortness of breath  Cardiovascular: No chest pain or palpitations  Gastrointestinal: No nausea or vomiting  Neurological: No confusion or weakness    OBJECTIVE:     Vital Signs Range (Last 24H):  /65 (BP Location: Right arm, Patient Position: Lying)   Pulse 82   Temp 98.3 °F (36.8 °C) (Oral)   Resp 18   Ht 5' 4" (1.626 m)   Wt 59.3 kg (130 lb 11.7 oz)   SpO2 99%   Breastfeeding? No   BMI 22.44 kg/m²     Temp:  [97.5 °F (36.4 °C)-98.4 °F (36.9 °C)]   Pulse:  [72-86]   Resp:  [17-19]   BP: (105-126)/(63-72)   SpO2:  [96 %-100 %]     I & O (Last 24H):  No intake or output data in the 24 hours ending 03/01/19 0928    Physical Exam:  General appearance: Well developed, well nourished  Eyes:  Conjunctivae/corneas clear. PERRL.  Lungs: Normal respiratory effort,   clear to auscultation bilaterally   Heart: Regular rate and rhythm, S1, S2 normal, no murmur, rub or roopa. +valve click  Abdomen: Soft, non-tender non-distended; bowel sounds normal; no masses,  no organomegaly  Extremities: No cyanosis or clubbing. No edema.    Skin: Skin color, texture, turgor normal. No rashes or lesions  Neurologic: Normal strength and tone. No focal numbness or weakness   Right arm AVF      Laboratory Data:  Recent Labs   Lab 03/01/19  0523   WBC 7.91   RBC 3.49*   HGB 9.3*   HCT 29.4*      MCV 84   MCH 26.6*   MCHC 31.6*       BMP:   Recent Labs   Lab 03/01/19 0523   *   *   K 4.1   CL 96   CO2 26   BUN 41*   CREATININE 3.1*   CALCIUM 8.9   MG 2.0     Lab Results   Component Value Date    CALCIUM 8.9 03/01/2019    PHOS 4.5 03/01/2019       Lab Results   Component Value Date    CALCIUM 8.9 03/01/2019    PHOS 4.5 " 03/01/2019     Recent Labs   Lab 03/01/19  0523   INR 1.1   APTT 63.4*  63.4*       No results found for: URICACID    BNP  No results for input(s): BNP, BNPTRIAGEBLO in the last 168 hours.    Medications:  Medication list was reviewed and changes noted under Assessment/Plan.    Diagnostic Results:        ASSESSMENT/PLAN:     1. End-stage renal disease on hemodialysis Monday Wednesday and Fridays at [facility] (N18.6, Z 99.2):  Followed by Dr. Mcneill at [facility] 2nd shift.  Consent signed and continue MWF.  Renally dose meds, avoid nephrotoxins, and monitor I/O's closely.  2. AVF Malfxn (T82.912I):  Heparin drip prior to fistulogram.  F/U fistulogram s/p ballooning of stenosis.  Thanks Dr. Wiley.  3. Prosthetic mitral valve on Coumadin (Z95.2):  Followed by Dr. Cuevas. Resumed coumadin following fistulogram.    4. Anemia of CKD (D63.1):  epo on HD.   5. DM (E11.65):  Resume routine insulin.  6. Migraine HA (G43.909):  PRN meds.       DC home when INR therapeutic.

## 2019-03-01 NOTE — PLAN OF CARE
Problem: Adult Inpatient Plan of Care  Goal: Plan of Care Review  Outcome: Ongoing (interventions implemented as appropriate)  Pt resting in bed. NAD, VSS on RA, AOx4. Pt not complaining of pain. Ambulatory with stand by assist. Heparin drip going at 10.8mL//hr. Monitoring INR. Pt tolerated fistulogram today. POC reviewed with pt and family. Bed low and locked. Personal items and call light within reach. Will continue to monitor.

## 2019-03-01 NOTE — ASSESSMENT & PLAN NOTE
Coumadin held for fistulogram.  Continue Heparin.  increase oral anticoagulation to 15 mg this evening  Will discharge when INR goal >2

## 2019-03-01 NOTE — ASSESSMENT & PLAN NOTE
Coumadin held for fistulogram.  Heparin started.  Resume oral anticoagulation this evening  INR goal >2

## 2019-03-01 NOTE — PLAN OF CARE
Problem: Adult Inpatient Plan of Care  Goal: Plan of Care Review  Outcome: Ongoing (interventions implemented as appropriate)  O2 not in use.

## 2019-03-01 NOTE — SUBJECTIVE & OBJECTIVE
Interval History: No complaints s/p fistulogram.  Discussed plan of care with nurse, family at bedside. Everyone in agreement.     Review of Systems   Constitutional: Negative for chills and fever.   Respiratory: Negative for chest tightness and shortness of breath.    Cardiovascular: Negative for chest pain, palpitations and leg swelling.   Gastrointestinal: Negative for abdominal pain and blood in stool.   Genitourinary: Negative for dysuria, flank pain and frequency.   Musculoskeletal: Negative for arthralgias, back pain and joint swelling.   Neurological: Negative for seizures and syncope.   Hematological: Does not bruise/bleed easily.     Objective:     Vital Signs (Most Recent):  Temp: 98.3 °F (36.8 °C) (02/28/19 1944)  Pulse: 83 (02/28/19 1944)  Resp: 18 (02/28/19 1944)  BP: 114/69 (02/28/19 1944)  SpO2: 100 % (02/28/19 1944) Vital Signs (24h Range):  Temp:  [97.4 °F (36.3 °C)-98.3 °F (36.8 °C)] 98.3 °F (36.8 °C)  Pulse:  [72-85] 83  Resp:  [18-20] 18  SpO2:  [97 %-100 %] 100 %  BP: (105-126)/(64-72) 114/69     Weight: 59.3 kg (130 lb 11.7 oz)  Body mass index is 22.44 kg/m².  No intake or output data in the 24 hours ending 02/28/19 2036   Physical Exam   Constitutional: She is oriented to person, place, and time. She appears well-developed and well-nourished. No distress.   HENT:   Head: Normocephalic and atraumatic.   Eyes: Conjunctivae are normal.   Cardiovascular: Regular rhythm, normal heart sounds and intact distal pulses. Exam reveals no gallop.   No murmur heard.  Pulmonary/Chest: Effort normal and breath sounds normal. No stridor. She has no wheezes.   Abdominal: Soft. Bowel sounds are normal. She exhibits no distension. There is no tenderness.   Neurological: She is alert and oriented to person, place, and time.   Skin: Skin is warm and dry. She is not diaphoretic. No erythema.   Psychiatric: She has a normal mood and affect.   Nursing note and vitals reviewed.      Significant Labs:   CBC:   Recent  Labs   Lab 02/26/19 2237 02/27/19  0558 02/28/19  0554   WBC 7.05 7.39 6.68   HGB 9.5* 9.1* 9.8*   HCT 29.7* 28.3* 31.1*    275 304     CMP:   Recent Labs   Lab 02/26/19 2237 02/27/19  0558 02/28/19  0554    139 136   K 3.6 3.4* 3.7    100 97   CO2 23 25 27   * 92 116*   BUN 45* 48* 32*   CREATININE 3.3* 3.1* 2.5*   CALCIUM 8.6* 8.7 8.8   PROT 7.2 6.5 6.5   ALBUMIN 2.7* 2.7* 2.6*   BILITOT 1.0 0.7 0.9   ALKPHOS 124 119 125   AST 25 19 21   ALT 15 13 14   ANIONGAP 15 14 12   EGFRNONAA 13* 14* 19*     All pertinent labs within the past 24 hours have been reviewed.    Significant Imaging: I have reviewed all pertinent imaging results/findings within the past 24 hours.

## 2019-03-01 NOTE — PROGRESS NOTES
[facility] Medicine  Progress Note    Patient Name: Jose Francisco Martines  MRN: [id number]  Patient Class: OP- Observation   Admission Date: 2/26/2019  Length of Stay: 0 days  Attending Physician: Raysa Francisco, *  Primary Care Provider: Yobani Wang MD        Subjective:     Principal Problem:Inadequate flow of hemodialysis AV fistula    HPI:  The patient is a 73 y.o. female, with history of ESRD, DM, HTN, and on chronic anticoagulation due to MVR for mitral valve prolapse, who presents to the ED on recommendation by Dr. Wiley (Nephrology) to be started on a heparin drip. Patient has a history of clotting disorder and is scheduled to have a fistulagram tomorrow due to high arterial pressure. She reports that her fistula is still being used for dialysis, and she last completed a full session of dialysis yesterday. Patient states she has been compliant with Warfarin (10 mg) for MVR. She reports that she has not taken Coumadin for a week because her INR level was 5.2 last week. Patient states she was evaluated by her cardiologist today, and her INR was 1.9. She denies history of DVT or PE. She has been at baseline the last few weeks. She has not been experiencing fevers, chills, headaches, dizziness, congestion, rhinorrhea, sore throat, cough, SOB, chest pain, abdominal pain, N/V/D, dysuria, urinary frequency, or urinary urgency. She has no complaints.         Hospital Course:  Fistulogram performed today and patient tolerated procedure well.    Interval History: No complaints s/p fistulogram.  Discussed plan of care with nurse, family at bedside. Everyone in agreement.     Review of Systems   Constitutional: Negative for chills and fever.   Respiratory: Negative for chest tightness and shortness of breath.    Cardiovascular: Negative for chest pain, palpitations and leg swelling.   Gastrointestinal: Negative for abdominal pain and blood in stool.   Genitourinary: Negative for  dysuria, flank pain and frequency.   Musculoskeletal: Negative for arthralgias, back pain and joint swelling.   Neurological: Negative for seizures and syncope.   Hematological: Does not bruise/bleed easily.     Objective:     Vital Signs (Most Recent):  Temp: 98.3 °F (36.8 °C) (02/28/19 1944)  Pulse: 83 (02/28/19 1944)  Resp: 18 (02/28/19 1944)  BP: 114/69 (02/28/19 1944)  SpO2: 100 % (02/28/19 1944) Vital Signs (24h Range):  Temp:  [97.4 °F (36.3 °C)-98.3 °F (36.8 °C)] 98.3 °F (36.8 °C)  Pulse:  [72-85] 83  Resp:  [18-20] 18  SpO2:  [97 %-100 %] 100 %  BP: (105-126)/(64-72) 114/69     Weight: 59.3 kg (130 lb 11.7 oz)  Body mass index is 22.44 kg/m².  No intake or output data in the 24 hours ending 02/28/19 2036   Physical Exam   Constitutional: She is oriented to person, place, and time. She appears well-developed and well-nourished. No distress.   HENT:   Head: Normocephalic and atraumatic.   Eyes: Conjunctivae are normal.   Cardiovascular: Regular rhythm, normal heart sounds and intact distal pulses. Exam reveals no gallop.   No murmur heard.  Pulmonary/Chest: Effort normal and breath sounds normal. No stridor. She has no wheezes.   Abdominal: Soft. Bowel sounds are normal. She exhibits no distension. There is no tenderness.   Neurological: She is alert and oriented to person, place, and time.   Skin: Skin is warm and dry. She is not diaphoretic. No erythema.   Psychiatric: She has a normal mood and affect.   Nursing note and vitals reviewed.      Significant Labs:   CBC:   Recent Labs   Lab 02/26/19 2237 02/27/19  0558 02/28/19  0554   WBC 7.05 7.39 6.68   HGB 9.5* 9.1* 9.8*   HCT 29.7* 28.3* 31.1*    275 304     CMP:   Recent Labs   Lab 02/26/19 2237 02/27/19  0558 02/28/19  0554    139 136   K 3.6 3.4* 3.7    100 97   CO2 23 25 27   * 92 116*   BUN 45* 48* 32*   CREATININE 3.3* 3.1* 2.5*   CALCIUM 8.6* 8.7 8.8   PROT 7.2 6.5 6.5   ALBUMIN 2.7* 2.7* 2.6*   BILITOT 1.0 0.7 0.9    ALKPHOS 124 119 125   AST 25 19 21   ALT 15 13 14   ANIONGAP 15 14 12   EGFRNONAA 13* 14* 19*     All pertinent labs within the past 24 hours have been reviewed.    Significant Imaging: I have reviewed all pertinent imaging results/findings within the past 24 hours.    Assessment/Plan:      * Inadequate flow of hemodialysis AV fistula    Fistula now functional s/p fistulogram  HD tomorrow       Type 2 diabetes mellitus with kidney complication, with long-term current use of insulin    Pt NPO for procedure  Hold long acting insulin till eating  Cover with SSI       H/O prosthetic mitral valve    Coumadin held for fistulogram.  Heparin started.  Resume oral anticoagulation this evening  INR goal >2       ESRD (end stage renal disease)    Consulted Nephrology  HD tomorrow  Will resume home schedule upon discharge MWF       VTE Risk Mitigation (From admission, onward)        Ordered     warfarin (COUMADIN) tablet 10 mg  Daily      02/28/19 1653     Place sequential compression device  Until discontinued      02/27/19 0104     IP VTE HIGH RISK PATIENT  Once      02/27/19 0104     Reason for No Pharmacological VTE Prophylaxis  Once      02/27/19 0104     Place sequential compression device  Until discontinued      02/27/19 0104     heparin 25,000 units in dextrose 5% 250 mL (100 units/mL) infusion HIGH INTENSITY nomogram - [facility]  Continuous      02/26/19 2236     heparin 25,000 units in dextrose 5% (100 units/ml) IV bolus from bag - ADDITIONAL PRN BOLUS - 60 units/kg  As needed (PRN)      02/26/19 2236     heparin 25,000 units in dextrose 5% (100 units/ml) IV bolus from bag - ADDITIONAL PRN BOLUS - 30 units/kg  As needed (PRN)      02/26/19 2236              Raysa Francisco MD  Department of Hospital Medicine   [facility]

## 2019-03-01 NOTE — SUBJECTIVE & OBJECTIVE
Interval History: No complaints this morning, just eager to go home.  Evaluated patient while on dialysis.  Discussed plan of care and patient in agreement.     Review of Systems   Constitutional: Negative for chills and fever.   Respiratory: Negative for chest tightness and shortness of breath.    Cardiovascular: Negative for chest pain, palpitations and leg swelling.   Gastrointestinal: Negative for abdominal pain and blood in stool.   Genitourinary: Negative for dysuria, flank pain and frequency.   Musculoskeletal: Negative for arthralgias, back pain and joint swelling.   Neurological: Negative for seizures and syncope.   Hematological: Does not bruise/bleed easily.   All other systems reviewed and are negative.    Objective:     Vital Signs (Most Recent):  Temp: 98.9 °F (37.2 °C) (03/01/19 1512)  Pulse: 88 (03/01/19 1512)  Resp: 18 (03/01/19 1512)  BP: 113/64 (03/01/19 1512)  SpO2: 96 % (03/01/19 1512) Vital Signs (24h Range):  Temp:  [97.9 °F (36.6 °C)-98.9 °F (37.2 °C)] 98.9 °F (37.2 °C)  Pulse:  [80-97] 88  Resp:  [16-18] 18  SpO2:  [96 %-100 %] 96 %  BP: (111-132)/(63-85) 113/64     Weight: 59.3 kg (130 lb 11.7 oz)  Body mass index is 22.44 kg/m².    Intake/Output Summary (Last 24 hours) at 3/1/2019 1745  Last data filed at 3/1/2019 1215  Gross per 24 hour   Intake --   Output 1500 ml   Net -1500 ml      Physical Exam   Constitutional: She is oriented to person, place, and time. She appears well-developed and well-nourished. No distress.   HENT:   Head: Normocephalic and atraumatic.   Eyes: Conjunctivae are normal.   Cardiovascular: Regular rhythm, normal heart sounds and intact distal pulses. Exam reveals no gallop.   No murmur heard.  Pulmonary/Chest: Effort normal and breath sounds normal. No stridor. She has no wheezes.   Abdominal: Soft. Bowel sounds are normal. She exhibits no distension. There is no tenderness.   Neurological: She is alert and oriented to person, place, and time.   Skin: Skin is warm  and dry. She is not diaphoretic. No erythema.   Psychiatric: She has a normal mood and affect.   Nursing note and vitals reviewed.      Significant Labs:   CBC:   Recent Labs   Lab 02/28/19  0554 03/01/19  0523   WBC 6.68 7.91   HGB 9.8* 9.3*   HCT 31.1* 29.4*    290     CMP:   Recent Labs   Lab 02/28/19  0554 03/01/19  0523    134*   K 3.7 4.1   CL 97 96   CO2 27 26   * 146*   BUN 32* 41*   CREATININE 2.5* 3.1*   CALCIUM 8.8 8.9   PROT 6.5 6.3   ALBUMIN 2.6* 2.6*   BILITOT 0.9 0.8   ALKPHOS 125 122   AST 21 19   ALT 14 10   ANIONGAP 12 12   EGFRNONAA 19* 14*     Coagulation:   Recent Labs   Lab 03/01/19  0523 03/01/19  1156   INR 1.1  --    APTT 63.4*  63.4* 72.0*     All pertinent labs within the past 24 hours have been reviewed.    Significant Imaging: I have reviewed all pertinent imaging results/findings within the past 24 hours.

## 2019-03-02 LAB
ALBUMIN SERPL BCP-MCNC: 2.4 G/DL
ALP SERPL-CCNC: 116 U/L
ALT SERPL W/O P-5'-P-CCNC: 10 U/L
ANION GAP SERPL CALC-SCNC: 12 MMOL/L
APTT BLDCRRT: 42 SEC
APTT BLDCRRT: 49.5 SEC
APTT BLDCRRT: 61.9 SEC
AST SERPL-CCNC: 17 U/L
BASOPHILS # BLD AUTO: 0.02 K/UL
BASOPHILS NFR BLD: 0.3 %
BILIRUB SERPL-MCNC: 0.9 MG/DL
BUN SERPL-MCNC: 18 MG/DL
CALCIUM SERPL-MCNC: 8.9 MG/DL
CHLORIDE SERPL-SCNC: 94 MMOL/L
CO2 SERPL-SCNC: 29 MMOL/L
CREAT SERPL-MCNC: 2.6 MG/DL
DIFFERENTIAL METHOD: ABNORMAL
EOSINOPHIL # BLD AUTO: 0.1 K/UL
EOSINOPHIL NFR BLD: 1 %
ERYTHROCYTE [DISTWIDTH] IN BLOOD BY AUTOMATED COUNT: 14.8 %
EST. GFR  (AFRICAN AMERICAN): 20 ML/MIN/1.73 M^2
EST. GFR  (NON AFRICAN AMERICAN): 18 ML/MIN/1.73 M^2
GLUCOSE SERPL-MCNC: 141 MG/DL
HCT VFR BLD AUTO: 28.3 %
HGB BLD-MCNC: 9 G/DL
INR PPP: 1.3
LYMPHOCYTES # BLD AUTO: 1.1 K/UL
LYMPHOCYTES NFR BLD: 15.5 %
MAGNESIUM SERPL-MCNC: 1.8 MG/DL
MCH RBC QN AUTO: 27.1 PG
MCHC RBC AUTO-ENTMCNC: 31.8 G/DL
MCV RBC AUTO: 85 FL
MONOCYTES # BLD AUTO: 0.8 K/UL
MONOCYTES NFR BLD: 11 %
NEUTROPHILS # BLD AUTO: 5.2 K/UL
NEUTROPHILS NFR BLD: 71.8 %
PHOSPHATE SERPL-MCNC: 3.3 MG/DL
PLATELET # BLD AUTO: 287 K/UL
PMV BLD AUTO: 9.4 FL
POCT GLUCOSE: 146 MG/DL (ref 70–110)
POCT GLUCOSE: 164 MG/DL (ref 70–110)
POCT GLUCOSE: 196 MG/DL (ref 70–110)
POCT GLUCOSE: 255 MG/DL (ref 70–110)
POTASSIUM SERPL-SCNC: 3.6 MMOL/L
PROT SERPL-MCNC: 6.3 G/DL
PROTHROMBIN TIME: 14.6 SEC
RBC # BLD AUTO: 3.32 M/UL
SODIUM SERPL-SCNC: 135 MMOL/L
WBC # BLD AUTO: 7.21 K/UL

## 2019-03-02 PROCEDURE — 63600175 PHARM REV CODE 636 W HCPCS: Performed by: EMERGENCY MEDICINE

## 2019-03-02 PROCEDURE — 25000003 PHARM REV CODE 250: Performed by: NURSE PRACTITIONER

## 2019-03-02 PROCEDURE — 25000003 PHARM REV CODE 250: Performed by: INTERNAL MEDICINE

## 2019-03-02 PROCEDURE — 85025 COMPLETE CBC W/AUTO DIFF WBC: CPT

## 2019-03-02 PROCEDURE — 11000001 HC ACUTE MED/SURG PRIVATE ROOM

## 2019-03-02 PROCEDURE — 85610 PROTHROMBIN TIME: CPT

## 2019-03-02 PROCEDURE — 83735 ASSAY OF MAGNESIUM: CPT

## 2019-03-02 PROCEDURE — 94761 N-INVAS EAR/PLS OXIMETRY MLT: CPT

## 2019-03-02 PROCEDURE — 84100 ASSAY OF PHOSPHORUS: CPT

## 2019-03-02 PROCEDURE — 85730 THROMBOPLASTIN TIME PARTIAL: CPT

## 2019-03-02 PROCEDURE — 99233 SBSQ HOSP IP/OBS HIGH 50: CPT | Mod: ,,, | Performed by: INTERNAL MEDICINE

## 2019-03-02 PROCEDURE — 63600175 PHARM REV CODE 636 W HCPCS: Performed by: NURSE PRACTITIONER

## 2019-03-02 PROCEDURE — 80053 COMPREHEN METABOLIC PANEL: CPT

## 2019-03-02 PROCEDURE — 99233 PR SUBSEQUENT HOSPITAL CARE,LEVL III: ICD-10-PCS | Mod: ,,, | Performed by: INTERNAL MEDICINE

## 2019-03-02 PROCEDURE — 36415 COLL VENOUS BLD VENIPUNCTURE: CPT

## 2019-03-02 RX ADMIN — CALCIUM ACETATE 667 MG: 667 CAPSULE ORAL at 08:03

## 2019-03-02 RX ADMIN — WARFARIN SODIUM 15 MG: 5 TABLET ORAL at 05:03

## 2019-03-02 RX ADMIN — GABAPENTIN 100 MG: 100 CAPSULE ORAL at 08:03

## 2019-03-02 RX ADMIN — HEPARIN SODIUM 14 UNITS/KG/HR: 10000 INJECTION, SOLUTION INTRAVENOUS at 05:03

## 2019-03-02 RX ADMIN — INSULIN ASPART 1 UNITS: 100 INJECTION, SOLUTION INTRAVENOUS; SUBCUTANEOUS at 10:03

## 2019-03-02 RX ADMIN — CALCIUM ACETATE 667 MG: 667 CAPSULE ORAL at 01:03

## 2019-03-02 RX ADMIN — TORSEMIDE 20 MG: 10 TABLET ORAL at 10:03

## 2019-03-02 RX ADMIN — CALCIUM ACETATE 667 MG: 667 CAPSULE ORAL at 05:03

## 2019-03-02 RX ADMIN — FOLIC ACID 1 MG: 1 TABLET ORAL at 08:03

## 2019-03-02 RX ADMIN — PRAVASTATIN SODIUM 40 MG: 20 TABLET ORAL at 08:03

## 2019-03-02 NOTE — PLAN OF CARE
Problem: Adult Inpatient Plan of Care  Goal: Plan of Care Review  Outcome: Ongoing (interventions implemented as appropriate)  Patient on room air.

## 2019-03-02 NOTE — PROGRESS NOTES
"Nephrology  Progress Note    Admit Date: 2/26/2019   LOS: 1 day     SUBJECTIVE:     Follow-up For:  Inadequate flow of hemodialysis AV fistula    Interval History:    No CP/SOB.  Comfortable.    Review of Systems:  Constitutional: No fever or chills  Respiratory: No cough or shortness of breath  Cardiovascular: No chest pain or palpitations  Gastrointestinal: No nausea or vomiting  Neurological: No confusion or weakness    OBJECTIVE:     Vital Signs Range (Last 24H):  /66 (BP Location: Left arm, Patient Position: Lying)   Pulse 84   Temp 98.4 °F (36.9 °C) (Oral)   Resp 18   Ht 5' 4" (1.626 m)   Wt 59.3 kg (130 lb 11.7 oz)   SpO2 98%   Breastfeeding? No   BMI 22.44 kg/m²     Temp:  [97.9 °F (36.6 °C)-98.9 °F (37.2 °C)]   Pulse:  [80-96]   Resp:  [16-20]   BP: (103-132)/(60-78)   SpO2:  [96 %-99 %]     I & O (Last 24H):    Intake/Output Summary (Last 24 hours) at 3/2/2019 1128  Last data filed at 3/1/2019 1400  Gross per 24 hour   Intake 170 ml   Output 1500 ml   Net -1330 ml       Physical Exam:  General appearance: Well developed, well nourished  Eyes:  Conjunctivae/corneas clear. PERRL.  Lungs: Normal respiratory effort,   clear to auscultation bilaterally   Heart: Regular rate and rhythm, S1, S2 normal, no murmur, rub or roopa. +valve click  Abdomen: Soft, non-tender non-distended; bowel sounds normal; no masses,  no organomegaly  Extremities: No cyanosis or clubbing. No edema.    Skin: Skin color, texture, turgor normal. No rashes or lesions  Neurologic: Normal strength and tone. No focal numbness or weakness   Right arm AVF      Laboratory Data:  Recent Labs   Lab 03/02/19  0356   WBC 7.21   RBC 3.32*   HGB 9.0*   HCT 28.3*      MCV 85   MCH 27.1   MCHC 31.8*       BMP:   Recent Labs   Lab 03/02/19  0357   *   *   K 3.6   CL 94*   CO2 29   BUN 18   CREATININE 2.6*   CALCIUM 8.9   MG 1.8     Lab Results   Component Value Date    CALCIUM 8.9 03/02/2019    PHOS 3.3 03/02/2019 "       Lab Results   Component Value Date    CALCIUM 8.9 03/02/2019    PHOS 3.3 03/02/2019     Recent Labs   Lab 03/02/19  0357   INR 1.3*   APTT 61.9*       No results found for: URICACID    BNP  No results for input(s): BNP, BNPTRIAGEBLO in the last 168 hours.    Medications:  Medication list was reviewed and changes noted under Assessment/Plan.    Diagnostic Results:        ASSESSMENT/PLAN:     1. End-stage renal disease on hemodialysis Monday Wednesday and Fridays at [facility] (N18.6, Z 99.2):  Followed by Dr. Mcneill at [facility] 2nd shift.  Continue MWF.  Renally dose meds, avoid nephrotoxins, and monitor I/O's closely.  2. AVF Malfxn (T82.013N):  Heparin drip prior to fistulogram.  F/U fistulogram s/p ballooning of stenosis.  Thanks Dr. Wiley.  3. Prosthetic mitral valve on Coumadin (Z95.2):  Followed by Dr. Cuevas. Resumed coumadin following fistulogram.    4. Anemia of CKD (D63.1):  Epo on HD.   5. DM (E11.65):  Resume routine insulin.  6. Migraine HA (G43.909):  PRN meds.       DC home when INR therapeutic.

## 2019-03-03 VITALS
BODY MASS INDEX: 22.32 KG/M2 | TEMPERATURE: 97 F | HEART RATE: 82 BPM | RESPIRATION RATE: 18 BRPM | DIASTOLIC BLOOD PRESSURE: 57 MMHG | HEIGHT: 64 IN | OXYGEN SATURATION: 92 % | SYSTOLIC BLOOD PRESSURE: 106 MMHG | WEIGHT: 130.75 LBS

## 2019-03-03 PROBLEM — T82.898A INADEQUATE FLOW OF HEMODIALYSIS AV FISTULA: Status: RESOLVED | Noted: 2019-02-26 | Resolved: 2019-03-03

## 2019-03-03 LAB
ALBUMIN SERPL BCP-MCNC: 2.5 G/DL
ALP SERPL-CCNC: 108 U/L
ALT SERPL W/O P-5'-P-CCNC: 11 U/L
ANION GAP SERPL CALC-SCNC: 12 MMOL/L
APTT BLDCRRT: 131.9 SEC
AST SERPL-CCNC: 18 U/L
BASOPHILS # BLD AUTO: 0.02 K/UL
BASOPHILS NFR BLD: 0.2 %
BILIRUB SERPL-MCNC: 0.6 MG/DL
BUN SERPL-MCNC: 27 MG/DL
CALCIUM SERPL-MCNC: 8.8 MG/DL
CHLORIDE SERPL-SCNC: 94 MMOL/L
CO2 SERPL-SCNC: 27 MMOL/L
CREAT SERPL-MCNC: 4.1 MG/DL
DIFFERENTIAL METHOD: ABNORMAL
EOSINOPHIL # BLD AUTO: 0.1 K/UL
EOSINOPHIL NFR BLD: 0.8 %
ERYTHROCYTE [DISTWIDTH] IN BLOOD BY AUTOMATED COUNT: 14.7 %
EST. GFR  (AFRICAN AMERICAN): 12 ML/MIN/1.73 M^2
EST. GFR  (NON AFRICAN AMERICAN): 10 ML/MIN/1.73 M^2
GLUCOSE SERPL-MCNC: 150 MG/DL
HCT VFR BLD AUTO: 27 %
HGB BLD-MCNC: 8.5 G/DL
INR PPP: 2
LYMPHOCYTES # BLD AUTO: 1.1 K/UL
LYMPHOCYTES NFR BLD: 12.5 %
MAGNESIUM SERPL-MCNC: 1.9 MG/DL
MCH RBC QN AUTO: 26.9 PG
MCHC RBC AUTO-ENTMCNC: 31.5 G/DL
MCV RBC AUTO: 85 FL
MONOCYTES # BLD AUTO: 1 K/UL
MONOCYTES NFR BLD: 11.1 %
NEUTROPHILS # BLD AUTO: 6.8 K/UL
NEUTROPHILS NFR BLD: 75.2 %
PHOSPHATE SERPL-MCNC: 3.8 MG/DL
PLATELET # BLD AUTO: 257 K/UL
PMV BLD AUTO: 9 FL
POCT GLUCOSE: 148 MG/DL (ref 70–110)
POCT GLUCOSE: 250 MG/DL (ref 70–110)
POTASSIUM SERPL-SCNC: 3.5 MMOL/L
PROT SERPL-MCNC: 6 G/DL
PROTHROMBIN TIME: 22.7 SEC
RBC # BLD AUTO: 3.16 M/UL
SODIUM SERPL-SCNC: 133 MMOL/L
WBC # BLD AUTO: 9.02 K/UL

## 2019-03-03 PROCEDURE — 84100 ASSAY OF PHOSPHORUS: CPT

## 2019-03-03 PROCEDURE — 83735 ASSAY OF MAGNESIUM: CPT

## 2019-03-03 PROCEDURE — 85610 PROTHROMBIN TIME: CPT

## 2019-03-03 PROCEDURE — 85025 COMPLETE CBC W/AUTO DIFF WBC: CPT

## 2019-03-03 PROCEDURE — 36415 COLL VENOUS BLD VENIPUNCTURE: CPT

## 2019-03-03 PROCEDURE — 99239 HOSP IP/OBS DSCHRG MGMT >30: CPT | Mod: ,,, | Performed by: INTERNAL MEDICINE

## 2019-03-03 PROCEDURE — 85730 THROMBOPLASTIN TIME PARTIAL: CPT

## 2019-03-03 PROCEDURE — 99239 PR HOSPITAL DISCHARGE DAY,>30 MIN: ICD-10-PCS | Mod: ,,, | Performed by: INTERNAL MEDICINE

## 2019-03-03 PROCEDURE — 25000003 PHARM REV CODE 250: Performed by: NURSE PRACTITIONER

## 2019-03-03 PROCEDURE — 80053 COMPREHEN METABOLIC PANEL: CPT

## 2019-03-03 PROCEDURE — 25000003 PHARM REV CODE 250: Performed by: INTERNAL MEDICINE

## 2019-03-03 RX ADMIN — FOLIC ACID 1 MG: 1 TABLET ORAL at 09:03

## 2019-03-03 RX ADMIN — BUTALBITAL, ACETAMINOPHEN AND CAFFEINE 1 TABLET: 50; 325; 40 TABLET ORAL at 09:03

## 2019-03-03 RX ADMIN — GABAPENTIN 100 MG: 100 CAPSULE ORAL at 09:03

## 2019-03-03 RX ADMIN — ZOLPIDEM TARTRATE 5 MG: 5 TABLET ORAL at 12:03

## 2019-03-03 RX ADMIN — HEPARIN SODIUM 10 UNITS/KG/HR: 10000 INJECTION, SOLUTION INTRAVENOUS at 09:03

## 2019-03-03 RX ADMIN — CALCIUM ACETATE 667 MG: 667 CAPSULE ORAL at 12:03

## 2019-03-03 RX ADMIN — CALCIUM ACETATE 667 MG: 667 CAPSULE ORAL at 09:03

## 2019-03-03 NOTE — NURSING
Discharge instructions given, no questions at this time, daughter and pt acknowledged understanding. IV removed with catheter intact. Security at bedside returning belongings to pt. Belongings sent home with pt. Pt transported to [facility] via wheelchair.

## 2019-03-03 NOTE — PLAN OF CARE
Problem: Adult Inpatient Plan of Care  Goal: Plan of Care Review  Outcome: Ongoing (interventions implemented as appropriate)  Pt remained free of falls and injuries throughout shift. IV flushed and infusing. No complaints of pain. Ambulates without assistance. VSS on room air. Bed low and locked, call light within reach, side rails up X2. Will continue to monitor.

## 2019-03-03 NOTE — ASSESSMENT & PLAN NOTE
Consulted Nephrology, appreciate recs  HD yesterday  Will resume home schedule upon discharge MAYRA

## 2019-03-03 NOTE — PLAN OF CARE
Problem: Adult Inpatient Plan of Care  Goal: Plan of Care Review  Outcome: Ongoing (interventions implemented as appropriate)  Patient laying in bed, watching TV. Due meds given. R arm precaution observed. Heparin drip rate maintained per protocol. APTT ordered in the morning.  Patient expressed concern of having difficulty sleeping at night. Reviewed inpatient med and informed that she has a PRN med to help her sleep. Patient agreed to the plan. Zolpidem given. V/S stable. No s/sx of bleeding.

## 2019-03-03 NOTE — SUBJECTIVE & OBJECTIVE
Interval History: No complaints this morning, eager to go home, INR at 1.3, will discharge >2    Review of Systems   Constitutional: Negative for chills and fever.   Respiratory: Negative for chest tightness and shortness of breath.    Cardiovascular: Negative for chest pain, palpitations and leg swelling.   Gastrointestinal: Negative for abdominal pain and blood in stool.   Genitourinary: Negative for dysuria, flank pain and frequency.   Musculoskeletal: Negative for arthralgias, back pain and joint swelling.   Neurological: Negative for seizures and syncope.   Hematological: Does not bruise/bleed easily.   All other systems reviewed and are negative.    Objective:     Vital Signs (Most Recent):  Temp: 98.2 °F (36.8 °C) (03/02/19 1941)  Pulse: 88 (03/02/19 1947)  Resp: 18 (03/02/19 1947)  BP: (!) 110/57 (03/02/19 1941)  SpO2: 99 % (03/02/19 1947) Vital Signs (24h Range):  Temp:  [98.2 °F (36.8 °C)-98.8 °F (37.1 °C)] 98.2 °F (36.8 °C)  Pulse:  [83-96] 88  Resp:  [18-20] 18  SpO2:  [96 %-100 %] 99 %  BP: (103-132)/(57-69) 110/57     Weight: 59.3 kg (130 lb 11.7 oz)  Body mass index is 22.44 kg/m².    Intake/Output Summary (Last 24 hours) at 3/2/2019 2231  Last data filed at 3/2/2019 1200  Gross per 24 hour   Intake 240 ml   Output --   Net 240 ml      Physical Exam   Constitutional: She is oriented to person, place, and time. She appears well-developed and well-nourished. No distress.   HENT:   Head: Normocephalic and atraumatic.   Eyes: Conjunctivae are normal.   Cardiovascular: Regular rhythm, normal heart sounds and intact distal pulses. Exam reveals no gallop.   No murmur heard.  Pulmonary/Chest: Effort normal and breath sounds normal. No stridor. She has no wheezes.   Abdominal: Soft. Bowel sounds are normal. She exhibits no distension. There is no tenderness.   Neurological: She is alert and oriented to person, place, and time.   Skin: Skin is warm and dry. She is not diaphoretic. No erythema.   Psychiatric:  She has a normal mood and affect.   Nursing note and vitals reviewed.      Significant Labs:   CBC:   Recent Labs   Lab 03/01/19  0523 03/02/19  0356   WBC 7.91 7.21   HGB 9.3* 9.0*   HCT 29.4* 28.3*    287     CMP:   Recent Labs   Lab 03/01/19  0523 03/02/19  0357   * 135*   K 4.1 3.6   CL 96 94*   CO2 26 29   * 141*   BUN 41* 18   CREATININE 3.1* 2.6*   CALCIUM 8.9 8.9   PROT 6.3 6.3   ALBUMIN 2.6* 2.4*   BILITOT 0.8 0.9   ALKPHOS 122 116   AST 19 17   ALT 10 10   ANIONGAP 12 12   EGFRNONAA 14* 18*     Coagulation:   Recent Labs   Lab 03/02/19  0357  03/02/19  1638   INR 1.3*  --   --    APTT 61.9*   < > 49.5*    < > = values in this interval not displayed.     All pertinent labs within the past 24 hours have been reviewed.    Significant Imaging: I have reviewed all pertinent imaging results/findings within the past 24 hours.

## 2019-03-03 NOTE — PROGRESS NOTES
[facility] Hospital Medicine  Progress Note    Patient Name: Jose Francisco Martines  MRN: [id number]  Patient Class: IP- Inpatient   Admission Date: 2/26/2019  Length of Stay: 1 days  Attending Physician: Raysa Francisco, *  Primary Care Provider: Yobani Wang MD        Subjective:     Principal Problem:Inadequate flow of hemodialysis AV fistula    HPI:  The patient is a 73 y.o. female, with history of ESRD, DM, HTN, and on chronic anticoagulation due to MVR for mitral valve prolapse, who presents to the ED on recommendation by Dr. Wiley (Nephrology) to be started on a heparin drip. Patient has a history of clotting disorder and is scheduled to have a fistulagram tomorrow due to high arterial pressure. She reports that her fistula is still being used for dialysis, and she last completed a full session of dialysis yesterday. Patient states she has been compliant with Warfarin (10 mg) for MVR. She reports that she has not taken Coumadin for a week because her INR level was 5.2 last week. Patient states she was evaluated by her cardiologist today, and her INR was 1.9. She denies history of DVT or PE. She has been at baseline the last few weeks. She has not been experiencing fevers, chills, headaches, dizziness, congestion, rhinorrhea, sore throat, cough, SOB, chest pain, abdominal pain, N/V/D, dysuria, urinary frequency, or urinary urgency. She has no complaints.         Hospital Course:  Fistulogram performed today and patient tolerated procedure well.    Interval History: No complaints this morning, eager to go home, INR at 1.3, will discharge >2    Review of Systems   Constitutional: Negative for chills and fever.   Respiratory: Negative for chest tightness and shortness of breath.    Cardiovascular: Negative for chest pain, palpitations and leg swelling.   Gastrointestinal: Negative for abdominal pain and blood in stool.   Genitourinary: Negative for dysuria, flank pain and frequency.    Musculoskeletal: Negative for arthralgias, back pain and joint swelling.   Neurological: Negative for seizures and syncope.   Hematological: Does not bruise/bleed easily.   All other systems reviewed and are negative.    Objective:     Vital Signs (Most Recent):  Temp: 98.2 °F (36.8 °C) (03/02/19 1941)  Pulse: 88 (03/02/19 1947)  Resp: 18 (03/02/19 1947)  BP: (!) 110/57 (03/02/19 1941)  SpO2: 99 % (03/02/19 1947) Vital Signs (24h Range):  Temp:  [98.2 °F (36.8 °C)-98.8 °F (37.1 °C)] 98.2 °F (36.8 °C)  Pulse:  [83-96] 88  Resp:  [18-20] 18  SpO2:  [96 %-100 %] 99 %  BP: (103-132)/(57-69) 110/57     Weight: 59.3 kg (130 lb 11.7 oz)  Body mass index is 22.44 kg/m².    Intake/Output Summary (Last 24 hours) at 3/2/2019 2233  Last data filed at 3/2/2019 1200  Gross per 24 hour   Intake 240 ml   Output --   Net 240 ml      Physical Exam   Constitutional: She is oriented to person, place, and time. She appears well-developed and well-nourished. No distress.   HENT:   Head: Normocephalic and atraumatic.   Eyes: Conjunctivae are normal.   Cardiovascular: Regular rhythm, normal heart sounds and intact distal pulses. Exam reveals no gallop.   No murmur heard.  Pulmonary/Chest: Effort normal and breath sounds normal. No stridor. She has no wheezes.   Abdominal: Soft. Bowel sounds are normal. She exhibits no distension. There is no tenderness.   Neurological: She is alert and oriented to person, place, and time.   Skin: Skin is warm and dry. She is not diaphoretic. No erythema.   Psychiatric: She has a normal mood and affect.   Nursing note and vitals reviewed.      Significant Labs:   CBC:   Recent Labs   Lab 03/01/19  0523 03/02/19  0356   WBC 7.91 7.21   HGB 9.3* 9.0*   HCT 29.4* 28.3*    287     CMP:   Recent Labs   Lab 03/01/19  0523 03/02/19  0357   * 135*   K 4.1 3.6   CL 96 94*   CO2 26 29   * 141*   BUN 41* 18   CREATININE 3.1* 2.6*   CALCIUM 8.9 8.9   PROT 6.3 6.3   ALBUMIN 2.6* 2.4*   BILITOT 0.8  0.9   ALKPHOS 122 116   AST 19 17   ALT 10 10   ANIONGAP 12 12   EGFRNONAA 14* 18*     Coagulation:   Recent Labs   Lab 03/02/19  0357  03/02/19  1638   INR 1.3*  --   --    APTT 61.9*   < > 49.5*    < > = values in this interval not displayed.     All pertinent labs within the past 24 hours have been reviewed.    Significant Imaging: I have reviewed all pertinent imaging results/findings within the past 24 hours.    Assessment/Plan:      * Inadequate flow of hemodialysis AV fistula    Fistula now functional s/p fistulogram  HD today  RESOLVED       Type 2 diabetes mellitus with kidney complication, with long-term current use of insulin    Continue with SSI    POCT Glucose  164 255          H/O prosthetic mitral valve    Coumadin held for fistulogram.  Continue Heparin.  increase oral anticoagulation to 15 mg this evening  Will discharge when INR goal >2       ESRD (end stage renal disease)    Consulted Nephrology, appreciate recs  HD yesterday  Will resume home schedule upon discharge MWF       VTE Risk Mitigation (From admission, onward)        Ordered     warfarin (COUMADIN) tablet 15 mg  Daily      03/01/19 1656     Place sequential compression device  Until discontinued      02/27/19 0104     IP VTE HIGH RISK PATIENT  Once      02/27/19 0104     Reason for No Pharmacological VTE Prophylaxis  Once      02/27/19 0104     Place sequential compression device  Until discontinued      02/27/19 0104     heparin 25,000 units in dextrose 5% 250 mL (100 units/mL) infusion HIGH INTENSITY nomogram - [facility]  Continuous      02/26/19 2236     heparin 25,000 units in dextrose 5% (100 units/ml) IV bolus from bag - ADDITIONAL PRN BOLUS - 60 units/kg  As needed (PRN)      02/26/19 2236     heparin 25,000 units in dextrose 5% (100 units/ml) IV bolus from bag - ADDITIONAL PRN BOLUS - 30 units/kg  As needed (PRN)      02/26/19 2236              Raysa Francisco MD  Department of Hospital Medicine   [facility]

## 2019-03-03 NOTE — PLAN OF CARE
Problem: Adult Inpatient Plan of Care  Goal: Plan of Care Review  Outcome: Ongoing (interventions implemented as appropriate)  Patient is AAOx2, with Bipap ON; tolerating well. Due meds given. Needs attended. V/S stable. Seen from time to time. No complaints at this time.

## 2019-03-07 ENCOUNTER — TELEPHONE (OUTPATIENT)
Dept: NEPHROLOGY | Facility: CLINIC | Age: 74
End: 2019-03-07

## 2019-03-07 NOTE — TELEPHONE ENCOUNTER
----- Message from Lizet Fely sent at 3/7/2019 11:47 AM CST -----  Contact: Jose Francisco  tel:    [phone]  Pt.says she needs someone to help get rescheduled for  Her appt. At [facility].   She tried to reschedule but there is no access.    Pls call.

## 2019-03-12 ENCOUNTER — OFFICE VISIT (OUTPATIENT)
Dept: NEPHROLOGY | Facility: CLINIC | Age: 74
End: 2019-03-12
Payer: MEDICARE

## 2019-03-12 VITALS
HEART RATE: 90 BPM | RESPIRATION RATE: 17 BRPM | SYSTOLIC BLOOD PRESSURE: 115 MMHG | WEIGHT: 153.44 LBS | TEMPERATURE: 98 F | OXYGEN SATURATION: 99 % | DIASTOLIC BLOOD PRESSURE: 69 MMHG | HEIGHT: 64 IN | BODY MASS INDEX: 26.2 KG/M2

## 2019-03-12 DIAGNOSIS — T82.590D MECHANICAL COMPLICATION OF ARTERIOVENOUS FISTULA SURGICALLY CREATED, SUBSEQUENT ENCOUNTER: Primary | ICD-10-CM

## 2019-03-12 PROCEDURE — 99999 PR PBB SHADOW E&M-EST. PATIENT-LVL III: CPT | Mod: PBBFAC,,,

## 2019-03-12 PROCEDURE — 99213 OFFICE O/P EST LOW 20 MIN: CPT | Mod: PBBFAC

## 2019-03-12 PROCEDURE — 99999 PR PBB SHADOW E&M-EST. PATIENT-LVL III: ICD-10-PCS | Mod: PBBFAC,,,

## 2019-03-12 NOTE — PROGRESS NOTES
HPI:  73 year old woman with ESRD (MWF with dr. Guillen at [facility]), had recent fistulogram 2/28/19 with angioplasty of the juxta-anastomosis and the swing segment of her RUE brachiobasilic AV fistula. She is here for follow up exam, ultrasound.  She denies any recent issues with increased venous pressure alarms or decreased arterial pressure alarms, prolonged bleeding or difficulty in cannulation.        Exam:   ACCESS:  RUE brachiobasilic AV fistula with soft thrill and minimal pulsatility, appropriate pulse augmentation and partial collapse with arm elevation.      Ultrasound:  - arterial anastomosis patent without stenosis, the juxta-anastomosis stenosis appears to have resolved, with vessel diameter measured at 8 mm     - AVF outflow tract patent with no stenosis, although hard to visualize the entire venous outflow   - BFV approximately 800 ml/min     Impression/Plan:  Patent AV fistula following recent fistulogram of the RUE brachiocephalic AVF requiring angioplasty to the JA region and swing segment. would need a follow up US in 2 months US. Please refer sooner if issues with prolonged bleeding or elevated venous pressures.      Edith Dinero MD

## 2019-03-27 NOTE — DISCHARGE SUMMARY
[facility] Hospital Medicine  Discharge Summary      Patient Name: Jose Francisco Martines  MRN: [id number]  Admission Date: 2/26/2019  Hospital Length of Stay: 2 days  Discharge Date and Time: 3/3/2019  2:18 PM  Attending Physician: No att. providers found   Discharging Provider: Raysa Francisco MD  Primary Care Provider: Yobani Wang MD      HPI:   The patient is a 73 y.o. female, with history of ESRD, DM, HTN, and on chronic anticoagulation due to MVR for mitral valve prolapse, who presents to the ED on recommendation by Dr. Wiley (Nephrology) to be started on a heparin drip. Patient has a history of clotting disorder and is scheduled to have a fistulagram tomorrow due to high arterial pressure. She reports that her fistula is still being used for dialysis, and she last completed a full session of dialysis yesterday. Patient states she has been compliant with Warfarin (10 mg) for MVR. She reports that she has not taken Coumadin for a week because her INR level was 5.2 last week. Patient states she was evaluated by her cardiologist today, and her INR was 1.9. She denies history of DVT or PE. She has been at baseline the last few weeks. She has not been experiencing fevers, chills, headaches, dizziness, congestion, rhinorrhea, sore throat, cough, SOB, chest pain, abdominal pain, N/V/D, dysuria, urinary frequency, or urinary urgency. She has no complaints.         Procedure(s) (LRB):  FISTULOGRAM (N/A)      Hospital Course:   Fistulogram performed today and patient tolerated procedure well. Patient INR initially 1.4 on admit.  After fistulogram, patient restarted on coumadin, however not therapeutic so heparin infusion began to bridge patient until INR>2 on coumadin. On day 5 of hospital course, INR at goal and patient cleared for discharge with close follow up with PCP.Patient in agreement with plan at time of discharge.      Consults:       Final Active Diagnoses:    Diagnosis Date Noted  POA    ESRD (end stage renal disease) on dialysis [N18.6, Z99.2]  Not Applicable    H/O prosthetic mitral valve [Z95.2] 02/27/2019 Not Applicable    Type 2 diabetes mellitus with kidney complication, with long-term current use of insulin [E11.29, Z79.4] 02/27/2019 Not Applicable    ESRD (end stage renal disease) [N18.6] 02/21/2019 Yes      Problems Resolved During this Admission:    Diagnosis Date Noted Date Resolved POA    PRINCIPAL PROBLEM:  Inadequate flow of hemodialysis AV fistula [T82.898A] 02/26/2019 03/03/2019 Yes    Mechanical complication of arteriovenous fistula surgically created [T82.590A]  03/03/2019 Yes       Discharged Condition: stable    Disposition: Home or Self Care    Follow Up:  Follow-up Information     Yobani Wang MD In 1 week.    Specialty:  Internal Medicine  Why:  For follow up after hospital discharge  Contact information:  [address] [id number] [phone]                 Patient Instructions:      Diet renal     Activity as tolerated       Significant Diagnostic Studies: Labs: All labs within the past 24 hours have been reviewed    Pending Diagnostic Studies:     None         Medications:  Reconciled Home Medications:      Medication List      CONTINUE taking these medications    albuterol 0.63 mg/3 mL Nebu  Commonly known as:  ACCUNEB  Take 0.63 mg by nebulization every 6 (six) hours as needed. Rescue     amitriptyline 25 MG tablet  Commonly known as:  ELAVIL  20 mg every evening.     butalbital-acetaminophen-caffeine -40 mg -40 mg per tablet  Commonly known as:  FIORICET, ESGIC     calcium acetate 667 mg capsule  Commonly known as:  PHOSLO  Take 667 mg by mouth 3 (three) times daily with meals.     folic acid 800 MCG Tab  Commonly known as:  FOLVITE  Take 800 mcg by mouth once daily.     gabapentin 100 MG capsule  Commonly known as:  NEURONTIN  Take 100 mg by mouth 2 (two) times daily.     insulin glargine  (TOUJEO) 300 unit/mL (1.5 mL) Inpn pen  Commonly known as:  TOUJEO  Inject 12 Units into the skin every evening.     meclizine 25 mg tablet  Commonly known as:  ANTIVERT  3 (three) times daily as needed.     oxyCODONE-acetaminophen  mg per tablet  Commonly known as:  PERCOCET  Take 1 tablet by mouth every 4 (four) hours as needed for Pain.     pravastatin 40 MG tablet  Commonly known as:  PRAVACHOL  Take 40 mg by mouth once daily.     promethazine 12.5 MG Tab  Commonly known as:  PHENERGAN  Take 12.5 mg by mouth every 6 (six) hours as needed.     SITagliptin 25 MG Tab  Commonly known as:  JANUVIA  Take 100 mg by mouth once daily.     topiramate 100 MG tablet  Commonly known as:  TOPAMAX  100 mg.     torsemide 20 MG Tab  Commonly known as:  DEMADEX     warfarin 10 MG tablet  Commonly known as:  COUMADIN  Take 10 mg by mouth once daily.     zolpidem 10 mg Tab  Commonly known as:  AMBIEN  Take 5 mg by mouth nightly as needed.            Indwelling Lines/Drains at time of discharge:   Lines/Drains/Airways     Drain                 Hemodialysis AV Fistula Right upper arm -- days                Time spent on the discharge of patient: >30 minutes  Patient was seen and examined on the date of discharge and determined to be suitable for discharge.         Raysa Francisco MD  Department of Hospital Medicine  [facility]

## 2019-04-26 ENCOUNTER — OFFICE VISIT (OUTPATIENT)
Dept: URGENT CARE | Facility: CLINIC | Age: 74
End: 2019-04-26
Payer: MEDICARE

## 2019-04-26 VITALS
DIASTOLIC BLOOD PRESSURE: 66 MMHG | BODY MASS INDEX: 25.27 KG/M2 | HEIGHT: 64 IN | WEIGHT: 148 LBS | RESPIRATION RATE: 19 BRPM | SYSTOLIC BLOOD PRESSURE: 107 MMHG | HEART RATE: 87 BPM | OXYGEN SATURATION: 96 % | TEMPERATURE: 98 F

## 2019-04-26 DIAGNOSIS — R06.02 SOB (SHORTNESS OF BREATH): ICD-10-CM

## 2019-04-26 DIAGNOSIS — I50.9 CONGESTIVE HEART FAILURE, UNSPECIFIED HF CHRONICITY, UNSPECIFIED HEART FAILURE TYPE: Primary | ICD-10-CM

## 2019-04-26 DIAGNOSIS — R05.9 COUGH: ICD-10-CM

## 2019-04-26 PROCEDURE — 71046 X-RAY EXAM CHEST 2 VIEWS: CPT | Mod: FY,S$GLB,, | Performed by: RADIOLOGY

## 2019-04-26 PROCEDURE — 71046 XR CHEST PA AND LATERAL: ICD-10-PCS | Mod: FY,S$GLB,, | Performed by: RADIOLOGY

## 2019-04-26 PROCEDURE — 99214 PR OFFICE/OUTPT VISIT, EST, LEVL IV, 30-39 MIN: ICD-10-PCS | Mod: S$GLB,,, | Performed by: FAMILY MEDICINE

## 2019-04-26 PROCEDURE — 99214 OFFICE O/P EST MOD 30 MIN: CPT | Mod: S$GLB,,, | Performed by: FAMILY MEDICINE

## 2019-04-26 NOTE — PROGRESS NOTES
"Subjective:       Patient ID: Jose Francisco Martines is a 73 y.o. female.    Vitals:  height is 5' 4" (1.626 m) and weight is 67.1 kg (148 lb). Her oral temperature is 98.3 °F (36.8 °C). Her blood pressure is 107/66 and her pulse is 87. Her respiration is 19 and oxygen saturation is 96%.     Chief Complaint: Cough    Cough   This is a new problem. The current episode started 1 to 4 weeks ago (2 weeks). The problem has been gradually worsening. The problem occurs constantly. The cough is non-productive. Associated symptoms include ear pain, postnasal drip, shortness of breath and wheezing. Pertinent negatives include no chest pain, chills, ear congestion, eye redness, fever, headaches, heartburn, hemoptysis, myalgias, nasal congestion, rash, rhinorrhea, sore throat, sweats or weight loss. The symptoms are aggravated by lying down. She has tried oral steroids and OTC cough suppressant for the symptoms. The treatment provided no relief. Her past medical history is significant for asthma and bronchitis. There is no history of bronchiectasis, COPD, emphysema, environmental allergies or pneumonia.       Constitution: Negative for chills, sweating, fatigue and fever.   HENT: Positive for ear pain, congestion, postnasal drip, sinus pain and sinus pressure. Negative for sore throat and voice change.    Neck: Negative for painful lymph nodes.   Cardiovascular: Negative for chest pain.   Eyes: Negative for eye redness.   Respiratory: Positive for cough, shortness of breath and wheezing. Negative for chest tightness, sputum production, bloody sputum, COPD, stridor and asthma.    Gastrointestinal: Positive for diarrhea. Negative for nausea, vomiting and heartburn.   Musculoskeletal: Negative for muscle ache.   Skin: Negative for rash.   Allergic/Immunologic: Negative for environmental allergies, seasonal allergies and asthma.   Neurological: Negative for headaches.   Hematologic/Lymphatic: Negative for swollen lymph nodes.     "   Objective:      Physical Exam   Constitutional: She is oriented to person, place, and time. She appears well-developed and well-nourished. She is cooperative.  Non-toxic appearance. She does not appear ill. No distress.   HENT:   Head: Normocephalic and atraumatic.   Right Ear: Hearing, tympanic membrane and ear canal normal.   Left Ear: Hearing, tympanic membrane and ear canal normal.   Nose: No mucosal edema, rhinorrhea or nasal deformity. No epistaxis. Right sinus exhibits no maxillary sinus tenderness and no frontal sinus tenderness. Left sinus exhibits no maxillary sinus tenderness and no frontal sinus tenderness.   Mouth/Throat: Uvula is midline and mucous membranes are normal. No trismus in the jaw. Normal dentition. No uvula swelling. No posterior oropharyngeal erythema.   Eyes: Conjunctivae and lids are normal. No scleral icterus.   Sclera clear bilat   Neck: Trachea normal, full passive range of motion without pain and phonation normal. Neck supple.   Cardiovascular: Normal rate, regular rhythm, intact distal pulses and normal pulses.   Murmur heard.  Pulmonary/Chest: No respiratory distress. She has no wheezes.   Crackles throughout fields, decreased air movement.   Abdominal: Soft. Normal appearance and bowel sounds are normal. She exhibits no distension. There is no tenderness.   Musculoskeletal: Normal range of motion. She exhibits edema. She exhibits no tenderness or deformity.   Neurological: She is alert and oriented to person, place, and time. She exhibits normal muscle tone. Coordination normal.   Skin: Skin is warm, dry and intact. She is not diaphoretic. No pallor.   Psychiatric: She has a normal mood and affect. Her speech is normal and behavior is normal. Judgment and thought content normal. Cognition and memory are normal.   Nursing note and vitals reviewed.      Assessment:       1. Congestive heart failure, unspecified HF chronicity, unspecified heart failure type    2. Cough    3. SOB  (shortness of breath)        Plan:         Congestive heart failure, unspecified HF chronicity, unspecified heart failure type  -     Refer to Emergency Dept.    Cough  -     X-Ray Chest PA And Lateral; Future; Expected date: 04/26/2019    SOB (shortness of breath)  -     Refer to Emergency Dept.          Patient Instructions   Pt daughter will take patient to [facility] ER for further evaluation of SOB, CHF    True Holman MD

## 2019-04-26 NOTE — PATIENT INSTRUCTIONS
Pt daughter will take patient to [facility] ER for further evaluation of SOB, CHF    True Holman MD

## 2019-07-15 ENCOUNTER — HOSPITAL ENCOUNTER (OUTPATIENT)
Facility: OTHER | Age: 74
Discharge: HOME | End: 2019-07-15
Attending: INTERNAL MEDICINE | Admitting: INTERNAL MEDICINE
Payer: MEDICARE

## 2019-07-15 VITALS
RESPIRATION RATE: 18 BRPM | HEART RATE: 73 BPM | TEMPERATURE: 98 F | DIASTOLIC BLOOD PRESSURE: 71 MMHG | SYSTOLIC BLOOD PRESSURE: 124 MMHG | OXYGEN SATURATION: 98 % | WEIGHT: 145 LBS | HEIGHT: 64 IN | BODY MASS INDEX: 24.75 KG/M2

## 2019-07-15 DIAGNOSIS — Z99.2 ESRD (END STAGE RENAL DISEASE) ON DIALYSIS: ICD-10-CM

## 2019-07-15 DIAGNOSIS — T82.9XXA COMPLICATION OF VASCULAR ACCESS FOR DIALYSIS: ICD-10-CM

## 2019-07-15 DIAGNOSIS — N18.6 ESRD (END STAGE RENAL DISEASE) ON DIALYSIS: ICD-10-CM

## 2019-07-15 DIAGNOSIS — T82.590A MECHANICAL COMPLICATION OF ARTERIOVENOUS FISTULA SURGICALLY CREATED, INITIAL ENCOUNTER: Primary | ICD-10-CM

## 2019-07-15 LAB
APTT BLDCRRT: 32.8 SEC (ref 21–32)
INR PPP: 1.4 (ref 0.8–1.2)
POCT GLUCOSE: 92 MG/DL (ref 70–110)
PROTHROMBIN TIME: 15.6 SEC (ref 9–12.5)

## 2019-07-15 PROCEDURE — 25000003 PHARM REV CODE 250: Performed by: INTERNAL MEDICINE

## 2019-07-15 PROCEDURE — 27201423 OPTIME MED/SURG SUP & DEVICES STERILE SUPPLY: Performed by: INTERNAL MEDICINE

## 2019-07-15 PROCEDURE — 36902 INTRO CATH DIALYSIS CIRCUIT: CPT | Performed by: INTERNAL MEDICINE

## 2019-07-15 PROCEDURE — C1725 CATH, TRANSLUMIN NON-LASER: HCPCS | Performed by: INTERNAL MEDICINE

## 2019-07-15 PROCEDURE — 63600175 PHARM REV CODE 636 W HCPCS: Performed by: INTERNAL MEDICINE

## 2019-07-15 PROCEDURE — 36415 COLL VENOUS BLD VENIPUNCTURE: CPT

## 2019-07-15 PROCEDURE — 85610 PROTHROMBIN TIME: CPT

## 2019-07-15 PROCEDURE — C1894 INTRO/SHEATH, NON-LASER: HCPCS | Performed by: INTERNAL MEDICINE

## 2019-07-15 PROCEDURE — 25500020 PHARM REV CODE 255: Performed by: INTERNAL MEDICINE

## 2019-07-15 PROCEDURE — 85730 THROMBOPLASTIN TIME PARTIAL: CPT

## 2019-07-15 PROCEDURE — 99152 MOD SED SAME PHYS/QHP 5/>YRS: CPT | Performed by: INTERNAL MEDICINE

## 2019-07-15 PROCEDURE — C1769 GUIDE WIRE: HCPCS | Performed by: INTERNAL MEDICINE

## 2019-07-15 RX ORDER — HEPARIN SOD,PORCINE/0.9 % NACL 1000/500ML
INTRAVENOUS SOLUTION INTRAVENOUS
Status: DISCONTINUED | OUTPATIENT
Start: 2019-07-15 | End: 2019-07-15 | Stop reason: HOSPADM

## 2019-07-15 RX ORDER — MIDAZOLAM HYDROCHLORIDE 1 MG/ML
INJECTION, SOLUTION INTRAMUSCULAR; INTRAVENOUS
Status: DISCONTINUED | OUTPATIENT
Start: 2019-07-15 | End: 2019-07-15 | Stop reason: HOSPADM

## 2019-07-15 RX ORDER — SODIUM CHLORIDE 0.9 % (FLUSH) 0.9 %
10 SYRINGE (ML) INJECTION
Status: DISCONTINUED | OUTPATIENT
Start: 2019-07-15 | End: 2019-07-15 | Stop reason: HOSPADM

## 2019-07-15 RX ORDER — FENTANYL CITRATE 50 UG/ML
INJECTION, SOLUTION INTRAMUSCULAR; INTRAVENOUS
Status: DISCONTINUED | OUTPATIENT
Start: 2019-07-15 | End: 2019-07-15 | Stop reason: HOSPADM

## 2019-07-15 RX ORDER — LIDOCAINE HYDROCHLORIDE 10 MG/ML
1 INJECTION, SOLUTION EPIDURAL; INFILTRATION; INTRACAUDAL; PERINEURAL ONCE
Status: COMPLETED | OUTPATIENT
Start: 2019-07-15 | End: 2019-07-15

## 2019-07-15 NOTE — PLAN OF CARE
Jose Francisco AR Martines has met all discharge criteria from Phase II. Vital Signs are stable, ambulating  without difficulty. Discharge instructions given, patient verbalized understanding. Discharged from facility via wheelchair in stable condition.

## 2019-07-15 NOTE — H&P
[facility] Cath Lab [place] 1  History & Physical    Subjective:      Chief Complaint/Reason for Admission: Here for declot vs fistulogram    Jose Francisco Martines is a 73 y.o. female with ESRD (MWF with dr. Guillen at [facility]) who presents today for declot vs fistulogram after her dialysis session on Friday had to end early (after 30 minutes) due to clotted ?access.  Her last fistulogram was performed in February of this year, during which we performed angioplasty in multiple spots, including the inflow and outflow.  This fistula was placed by Dr. Montes in July 2018.    Past Medical History:   Diagnosis Date    Asthma     Cardiac arrhythmia     Clotting disorder     Diabetes mellitus     End stage renal failure on dialysis     Hypertension      Past Surgical History:   Procedure Laterality Date    APPENDECTOMY      CARDIAC VALVE REPLACEMENT      FISTULOGRAM N/A 2/28/2019    Performed [facility] CATH LAB    FISTULOGRAM Right 2/21/2019    [facility]formed by [facility] at [facility] CATH LAB    OOPHORECTOMY       History reviewed. No pertinent family history.  Social History     Tobacco Use    Smoking status: Never Smoker    Smokeless tobacco: Never Used   Substance Use Topics    Alcohol use: Not on file    Drug use: Not on file       PTA Medications   Medication Sig    butalbital-acetaminophen-caffeine -40 mg (FIORICET, ESGIC) -40 mg per tablet     calcium acetate (PHOSLO) 667 mg capsule Take 667 mg by mouth 3 (three) times daily with meals.    folic acid (FOLVITE) 800 MCG Tab Take 800 mcg by mouth once daily.    gabapentin (NEURONTIN) 100 MG capsule Take 100 mg by mouth 2 (two) times daily.     insulin glargine, TOUJEO, (TOUJEO) 300 unit/mL (1.5 mL) InPn pen Inject 12 Units into the skin every evening.     meclizine (ANTIVERT) 25 mg tablet 3 (three) times daily as needed.     pravastatin (PRAVACHOL) 40 MG tablet Take 40 mg by mouth once daily.    zolpidem (AMBIEN) 10 mg Tab  Take 5 mg by mouth nightly as needed.    albuterol (ACCUNEB) 0.63 mg/3 mL Nebu Take 0.63 mg by nebulization every 6 (six) hours as needed. Rescue    promethazine (PHENERGAN) 12.5 MG Tab Take 12.5 mg by mouth every 6 (six) hours as needed.    torsemide (DEMADEX) 20 MG Tab     warfarin (COUMADIN) 10 MG tablet Take 7.5 mg by mouth once daily.      Review of patient's allergies indicates:   Allergen Reactions    Codeine Hives     Head aches, nausea, itch    Amitriptyline      Causes severe headache.        Review of Systems   Constitutional: Negative.    Respiratory: Negative.    Cardiovascular: Negative.        Objective:      Vital Signs (Most Recent)  Temp: 97.9 °F (36.6 °C) (07/15/19 1026)  Pulse: 82 (07/15/19 1026)  Resp: 18 (07/15/19 1026)  BP: 136/82 (07/15/19 1026)  SpO2: 99 % (07/15/19 1026)    Vital Signs Range (Last 24H):  Temp:  [97.9 °F (36.6 °C)]   Pulse:  [82]   Resp:  [18]   BP: (136)/(82)   SpO2:  [99 %]     Physical Exam   Constitutional: She is oriented to person, place, and time. She appears well-developed and well-nourished. No distress.   HENT:   Head: Normocephalic and atraumatic.   Eyes: EOM are normal.   Neck: Neck supple.   Pulmonary/Chest: Effort normal. No respiratory distress.   Musculoskeletal:   RUE brachiobasilic AVF with minimal thrill, but possible pulsatility felt further up the fistula with augmentation.   Neurological: She is alert and oriented to person, place, and time.   Skin: Skin is warm and dry. She is not diaphoretic.   Psychiatric: She has a normal mood and affect. Her behavior is normal.     She is on coumadin for mitral valve prosthetic, INR checked stat today and it is 1.4.    Assessment:      Active Hospital Problems    Diagnosis  POA    ESRD (end stage renal disease) on dialysis [N18.6, Z99.2]  Not Applicable      Resolved Hospital Problems   No resolved problems to display.       Plan:      Fistulogram vs declot today.  Risks explained, consent signed.

## 2019-07-15 NOTE — PROCEDURES
[facility] Interventional Nephrology Service    Fistulogram Procedure Report    Date of Procedure:  07/15/2019 1:24 PM    Procedures Performed: Fistulogram with venous angioplasty of the juxta-anastomosis region    Indication: poor blood flow and pulling clots during dialysis    Referring Provider: Dr. Guillen and [facility]    Primary Surgeon: Stan Wiley MD  Assist: none    Medications Administered:  1% lidocaine, 1 mg versed, 50 mcg fentanyl    Procedure Report in Detail:  After informed consent was obtained the patient was prepped and draped in the usual sterile fashion.  Her right upper brachiobasilic AVF was cannulated in the arterial facing direction with a micropuncture system, confirmed in correct placement under fluroscopy, and a reflux fistulogram was performed using iodinated contrast.  Fistulogram revealed severe (>90%) stenosis in two areas within the juxta-anastomosis region, no stenosis at the anastomosis.  More than 6cm of the brachial artery was visualized and it was patent.  Central vasculature was not able to be done because the sheath was impeding the outflow.  We also did not further evaluate the outflow since this was not a prior issue and was not felt to be the culprit lesion.  The micropuncture sheath was cannulated with a glidewire which was advanced to the brachial artery and the micropuncture sheath was removed.  A 6 Fr sheath was inserted over the glidewire, and the following interventions were performed after the patient was given 1 mg versed and 50 mcg fentanyl: using a 6 x 40 mm ultraverse pta balloon and endeflator, we advanced the balloon to the JA stenosis and inflated to full effacement with waist seen on balloon prior to full effacement.  Follow up film revealed residual stenosis proximal to our first area of angioplasty, so the same balloon was advanced closer to the anastomosis and was inflated to full effacement with waist seen prior to full effacement, this was not done at the  anastomosis or the artery.  A follow up reflux arteriogram revealed resolution of the stenosis with no evidence of extravasation.  The sheath was pulled, a #2 ethilon suture was used to achieve hemostasis.  No complications occurred during the procedure.     Estimated blood loss: < 10 mL   Estimated contrast used: 25 mL    Impression/Plan:  This was a successful fistulogram with angioplasty performed in the juxta-anastomosis region of the fistula.  We will have the patient return in 1 week for suture removal and follow up exam and ultrasound.  Please do refer the patient back if there are any further signs of stenosis (pulling clots, increased arterial or venous pressure alarms, prolonged bleeding or worsening adequacy).  This JA stenosis is likely to recur and will require serial monitoring with ultrasound to maintain patency of this fistula.    Stan Wiley MD  [phone]

## 2019-07-15 NOTE — DISCHARGE INSTRUCTIONS
Anesthesia: Monitored Anesthesia Care (MAC)    Anesthesia Safety  · Have an adult family member or friend drive you home after the procedure.  · For the first 24 hours after your surgery:  ¨ Do not drive or use heavy equipment.  ¨ Do not make important decisions or sign documents.  ¨ Avoid alcohol.  ¨ Have someone stay with you, if possible. They can watch for problems and help keep you safe.    PLEASE FOLLOW ANY OTHER INSTRUCTIONS PROVIDED TO YOU BY DR. MOLINA!

## 2019-07-15 NOTE — BRIEF OP NOTE
[facility] Cath Lab [facility] 1  Brief Operative Note     SUMMARY     Surgery Date: 7/15/2019     Surgeon(s) and Role:     * Stan Wiley MD - Primary    Assisting Surgeon: None    Pre-op Diagnosis:  T82.59; Mechanical complication of AV fistula, surgically created    Post-op Diagnosis:  Post-Op Diagnosis Codes:     * ESRD (end stage renal disease) on dialysis [N18.6, Z99.2]     * Complication of vascular access for dialysis [T82.9XXA]    Procedure(s) (LRB):  THROMBECTOMY, HEMODIALYSIS GRAFT OR FISTULA (N/A)  Fistulogram (Right)    Anesthesia: 1% lidocaine, 1 mg versed, 50 mcg fentanyl    Description of the findings of the procedure: Patient was prepped and draped in a sterile fashion.  This was a successful fistulogram of her RUE brachiobasilic AV fistula with angioplasty performed in the JA/inflow portion of the fistula.  Patient tolerated the procedure well and no immediate complications noted.      Findings/Key Components: Patient was prepped and draped in a sterile fashion.  This was a successful fistulogram of her RUE brachiobasilic AV fistula with angioplasty performed in the JA/inflow portion of the fistula.  Patient tolerated the procedure well and no immediate complications noted.      Estimated Blood Loss: < 10 mL         Specimens:   Specimen (12h ago, onward)    None          Discharge Note    SUMMARY     Admit Date: 7/15/2019    Discharge Date and Time:  07/15/2019 1:21 PM    Hospital Course (synopsis of major diagnoses, care, treatment, and services provided during the course of the hospital stay): Patient was prepped and draped in a sterile fashion.  This was a successful fistulogram of her RUE brachiobasilic AV fistula with angioplasty performed in the JA/inflow portion of the fistula.  Patient tolerated the procedure well and no immediate complications noted.       Final Diagnosis: Post-Op Diagnosis Codes:     * ESRD (end stage renal disease) on dialysis [N18.6, Z99.2]     * Complication of vascular  access for dialysis [T82.9XXA]    Disposition: Home or Self Care    Follow Up/Patient Instructions:     Medications:  Reconciled Home Medications:      Medication List      CONTINUE taking these medications    albuterol 0.63 mg/3 mL Nebu  Commonly known as:  ACCUNEB  Take 0.63 mg by nebulization every 6 (six) hours as needed. Rescue     butalbital-acetaminophen-caffeine -40 mg -40 mg per tablet  Commonly known as:  FIORICET, ESGIC     calcium acetate 667 mg capsule  Commonly known as:  PHOSLO  Take 667 mg by mouth 3 (three) times daily with meals.     folic acid 800 MCG Tab  Commonly known as:  FOLVITE  Take 800 mcg by mouth once daily.     gabapentin 100 MG capsule  Commonly known as:  NEURONTIN  Take 100 mg by mouth 2 (two) times daily.     insulin glargine (TOUJEO) 300 unit/mL (1.5 mL) Inpn pen  Commonly known as:  TOUJEO  Inject 12 Units into the skin every evening.     meclizine 25 mg tablet  Commonly known as:  ANTIVERT  3 (three) times daily as needed.     pravastatin 40 MG tablet  Commonly known as:  PRAVACHOL  Take 40 mg by mouth once daily.     promethazine 12.5 MG Tab  Commonly known as:  PHENERGAN  Take 12.5 mg by mouth every 6 (six) hours as needed.     torsemide 20 MG Tab  Commonly known as:  DEMADEX     warfarin 10 MG tablet  Commonly known as:  COUMADIN  Take 7.5 mg by mouth once daily.     zolpidem 10 mg Tab  Commonly known as:  AMBIEN  Take 5 mg by mouth nightly as needed.          Discharge Procedure Orders   Lifting restrictions   Order Comments: No heavy lifting for 48 hours     Call MD for:  temperature >100.4     Call MD for:  severe uncontrolled pain     Call MD for:  redness, tenderness, or signs of infection (pain, swelling, redness, odor or green/yellow discharge around incision site)     Call MD for:   Order Comments: Bleeding from the access site.     Activity as tolerated     Follow-up Information     [facility].    Why:  for dialysis  Contact  information:  [address] [facility] [address]  [phone]

## 2019-07-30 ENCOUNTER — OFFICE VISIT (OUTPATIENT)
Dept: NEPHROLOGY | Facility: CLINIC | Age: 74
End: 2019-07-30
Payer: MEDICARE

## 2019-07-30 VITALS
HEART RATE: 80 BPM | WEIGHT: 144.63 LBS | DIASTOLIC BLOOD PRESSURE: 67 MMHG | SYSTOLIC BLOOD PRESSURE: 108 MMHG | BODY MASS INDEX: 24.69 KG/M2 | HEIGHT: 64 IN | RESPIRATION RATE: 16 BRPM | TEMPERATURE: 98 F | OXYGEN SATURATION: 100 %

## 2019-07-30 DIAGNOSIS — N18.6 ESRD (END STAGE RENAL DISEASE): ICD-10-CM

## 2019-07-30 PROCEDURE — 99999 PR PBB SHADOW E&M-EST. PATIENT-LVL III: ICD-10-PCS | Mod: PBBFAC,,,

## 2019-07-30 PROCEDURE — 99213 OFFICE O/P EST LOW 20 MIN: CPT | Mod: PBBFAC

## 2019-07-30 PROCEDURE — 99213 OFFICE O/P EST LOW 20 MIN: CPT | Mod: S$PBB,,, | Performed by: INTERNAL MEDICINE

## 2019-07-30 PROCEDURE — 99999 PR PBB SHADOW E&M-EST. PATIENT-LVL III: CPT | Mod: PBBFAC,,,

## 2019-07-30 PROCEDURE — 99213 PR OFFICE/OUTPT VISIT, EST, LEVL III, 20-29 MIN: ICD-10-PCS | Mod: S$PBB,,, | Performed by: INTERNAL MEDICINE

## 2019-07-30 NOTE — PROGRESS NOTES
[facility] Interventional Nephrology Follow-up Exam    Date:   07/30/2019 10:58 AM    HPI:  73 year old woman with ESRD (MWF with dr. Guillen at [facility]), well known to the service, required interventions in the past to maintain patency of her RUE brachiobasilic AV fistula.  Last underwent fistulogram with venous angioplasty of the juxta-anastomosis region on 7/15/19.  Here for follow up exam, ultrasound and suture removal.  She has had no recent issues with prolonged bleeding, difficulty in cannulation or increased frequency of pressure alarms while on dialysis.    Vitals:    07/30/19 1103   BP: 108/67   Pulse: 80   Resp: 16   Temp: 97.8 °F (36.6 °C)     Exam:   ACCESS:  RUE brachiobasilic AV fistula with soft thrill and minimal pulsatility, appropriate pulse augmentation and partial collapse with arm elevation.  Aneurysm with no impending risk of rupture.  One suture removed without any complications.    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  The JA region appears patent and with good flow, but the outflow has a 50% stenosis visualized near the swing segment.  Plan follow up fistulogram on 8/13 to address this outflow stenosis, with plans to get an outpatient INR on 8/12.        Stan Wiley MD  [phone]

## 2019-08-13 NOTE — DISCHARGE INSTRUCTIONS
After the procedure:    · Your arm and hand will be checked to make sure blood is flowing through the fistula properly. The feeling of blood rushing through the fistula is called a thrill. It is somewhat similar to the purring of a cat. Youll be taught how to check for this feeling each day to make sure there are no problems with your fistula.   · Watch for bleeding-If bleeding occurs place light pressure on the area and call your physician.    Recovering at Home  Once at home, follow all of the instructions youve been given. Be sure to:  · Take all medications as directed.  · Care for your incision as instructed.  · Check for signs of infection at the incision site (see below).  · Avoid heavy lifting and strenuous activities as directed.  · Monitor and care for your fistula as instructed      Anesthesia: Monitored Anesthesia Care (MAC)    Anesthesia Safety  · Have an adult family member or friend drive you home after the procedure.  · For the first 24 hours after your surgery:  ¨ Do not drive or use heavy equipment.  ¨ Do not make important decisions or sign documents.  ¨ Avoid alcohol.  ¨ Have someone stay with you, if possible. They can watch for problems and help keep you safe.    PLEASE FOLLOW ANY OTHER INSTRUCTIONS PROVIDED TO YOU BY DR. HARO!

## 2019-08-27 ENCOUNTER — HOSPITAL ENCOUNTER (OUTPATIENT)
Facility: OTHER | Age: 74
Discharge: HOME | End: 2019-08-27
Attending: INTERNAL MEDICINE | Admitting: INTERNAL MEDICINE
Payer: MEDICARE

## 2019-08-27 VITALS
BODY MASS INDEX: 24.59 KG/M2 | WEIGHT: 144 LBS | HEART RATE: 70 BPM | SYSTOLIC BLOOD PRESSURE: 105 MMHG | OXYGEN SATURATION: 98 % | DIASTOLIC BLOOD PRESSURE: 64 MMHG | HEIGHT: 64 IN | RESPIRATION RATE: 18 BRPM | TEMPERATURE: 98 F

## 2019-08-27 DIAGNOSIS — N18.6 ESRD (END STAGE RENAL DISEASE) ON DIALYSIS: ICD-10-CM

## 2019-08-27 DIAGNOSIS — T82.590A MECHANICAL COMPLICATION OF ARTERIOVENOUS FISTULA SURGICALLY CREATED, INITIAL ENCOUNTER: Primary | ICD-10-CM

## 2019-08-27 DIAGNOSIS — Z99.2 ESRD (END STAGE RENAL DISEASE) ON DIALYSIS: ICD-10-CM

## 2019-08-27 LAB
INR PPP: 1.2 (ref 0.8–1.2)
PROTHROMBIN TIME: 13.4 SEC (ref 9–12.5)

## 2019-08-27 PROCEDURE — 85610 PROTHROMBIN TIME: CPT

## 2019-08-27 PROCEDURE — 25500020 PHARM REV CODE 255: Performed by: INTERNAL MEDICINE

## 2019-08-27 PROCEDURE — C1769 GUIDE WIRE: HCPCS | Performed by: INTERNAL MEDICINE

## 2019-08-27 PROCEDURE — C1725 CATH, TRANSLUMIN NON-LASER: HCPCS | Performed by: INTERNAL MEDICINE

## 2019-08-27 PROCEDURE — 27201423 OPTIME MED/SURG SUP & DEVICES STERILE SUPPLY: Performed by: INTERNAL MEDICINE

## 2019-08-27 PROCEDURE — 25000003 PHARM REV CODE 250: Performed by: INTERNAL MEDICINE

## 2019-08-27 PROCEDURE — 36902 INTRO CATH DIALYSIS CIRCUIT: CPT | Performed by: INTERNAL MEDICINE

## 2019-08-27 PROCEDURE — 63600175 PHARM REV CODE 636 W HCPCS: Performed by: INTERNAL MEDICINE

## 2019-08-27 PROCEDURE — C1894 INTRO/SHEATH, NON-LASER: HCPCS | Performed by: INTERNAL MEDICINE

## 2019-08-27 PROCEDURE — 36415 COLL VENOUS BLD VENIPUNCTURE: CPT

## 2019-08-27 RX ORDER — MIDAZOLAM HYDROCHLORIDE 2 MG/2ML
1 INJECTION, SOLUTION INTRAMUSCULAR; INTRAVENOUS ONCE
Status: COMPLETED | OUTPATIENT
Start: 2019-08-27 | End: 2019-08-27

## 2019-08-27 RX ORDER — SODIUM CHLORIDE 0.9 % (FLUSH) 0.9 %
10 SYRINGE (ML) INJECTION
Status: DISCONTINUED | OUTPATIENT
Start: 2019-08-27 | End: 2019-08-27 | Stop reason: HOSPADM

## 2019-08-27 RX ORDER — HEPARIN SOD,PORCINE/0.9 % NACL 1000/500ML
INTRAVENOUS SOLUTION INTRAVENOUS
Status: DISCONTINUED | OUTPATIENT
Start: 2019-08-27 | End: 2019-08-27 | Stop reason: HOSPADM

## 2019-08-27 RX ORDER — LIDOCAINE HYDROCHLORIDE 10 MG/ML
1 INJECTION, SOLUTION EPIDURAL; INFILTRATION; INTRACAUDAL; PERINEURAL ONCE
Status: COMPLETED | OUTPATIENT
Start: 2019-08-27 | End: 2019-08-27

## 2019-08-27 RX ORDER — FENTANYL CITRATE 50 UG/ML
25 INJECTION, SOLUTION INTRAMUSCULAR; INTRAVENOUS ONCE
Status: COMPLETED | OUTPATIENT
Start: 2019-08-27 | End: 2019-08-27

## 2019-08-27 NOTE — H&P
[facility] Cath Lab [place] 1  History & Physical    Subjective:      Chief Complaint/Reason for Admission: Here for declot vs fistulogram    Jose Francisco Martines is a 73 y.o. female with ESRD (MWF with dr. Guillen at [facility]) who presents today for fistulogram.  Her last fistulogram was performed in July of this year, during which we performed angioplasty in  region.  This fistula was placed by Dr. Montes in July 2018.    Past Medical History:   Diagnosis Date    Asthma     Cardiac arrhythmia     Clotting disorder     Diabetes mellitus     End stage renal failure on dialysis     Hypertension      Past Surgical History:   Procedure Laterality Date    APPENDECTOMY      CARDIAC VALVE REPLACEMENT      Fistulogram Right 7/15/2019    [facility]    FISTULOGRAM N/A 2/28/2019    [facility]    FISTULOGRAM Right 2/21/2019    [facility]    OOPHORECTOMY      THROMBECTOMY, HEMODIALYSIS GRAFT OR FISTULA N/A 7/15/2019    [facility]     History reviewed. No pertinent family history.  Social History     Tobacco Use    Smoking status: Never Smoker    Smokeless tobacco: Never Used   Substance Use Topics    Alcohol use: Not on file    Drug use: Not on file       PTA Medications   Medication Sig    albuterol (ACCUNEB) 0.63 mg/3 mL Nebu Take 0.63 mg by nebulization every 6 (six) hours as needed. Rescue    butalbital-acetaminophen-caffeine -40 mg (FIORICET, ESGIC) -40 mg per tablet     calcium acetate (PHOSLO) 667 mg capsule Take 667 mg by mouth 3 (three) times daily with meals.    folic acid (FOLVITE) 800 MCG Tab Take 800 mcg by mouth once daily.    gabapentin (NEURONTIN) 100 MG capsule Take 100 mg by mouth 2 (two) times daily.     insulin glargine, TOUJEO, (TOUJEO) 300 unit/mL (1.5 mL) InPn pen Inject 12 Units into the skin every evening.     meclizine  (ANTIVERT) 25 mg tablet 3 (three) times daily as needed.     pravastatin (PRAVACHOL) 40 MG tablet Take 40 mg by mouth once daily.    promethazine (PHENERGAN) 12.5 MG Tab Take 12.5 mg by mouth every 6 (six) hours as needed.    torsemide (DEMADEX) 20 MG Tab     zolpidem (AMBIEN) 10 mg Tab Take 5 mg by mouth nightly as needed.    warfarin (COUMADIN) 10 MG tablet Take 7.5 mg by mouth once daily.      Review of patient's allergies indicates:   Allergen Reactions    Codeine Hives     Head aches, nausea, itch    Amitriptyline      Causes severe headache.        Review of Systems   Constitutional: Negative.    Respiratory: Negative.    Cardiovascular: Negative.        Objective:      Vital Signs (Most Recent)  Temp: 97.8 °F (36.6 °C) (08/27/19 0941)  Pulse: 81 (08/27/19 0941)  Resp: 18 (08/27/19 0941)  BP: 119/71 (08/27/19 0941)  SpO2: 97 % (08/27/19 0941)    Vital Signs Range (Last 24H):  Temp:  [97.8 °F (36.6 °C)]   Pulse:  [81]   Resp:  [18]   BP: (119)/(71)   SpO2:  [97 %]     Physical Exam   Constitutional: She is oriented to person, place, and time. She appears well-developed and well-nourished. No distress.   HENT:   Head: Normocephalic and atraumatic.   Eyes: EOM are normal.   Neck: Neck supple.   Pulmonary/Chest: Effort normal. No respiratory distress.   Musculoskeletal:   RUE brachiobasilic AVF with great thrill, but mild to moderate pulsatility felt further up the fistula with augmentation.   Neurological: She is alert and oriented to person, place, and time.   Skin: Skin is warm and dry. She is not diaphoretic.   Psychiatric: She has a normal mood and affect. Her behavior is normal.     She is on coumadin for mitral valve prosthetic, INR checked stat today and it is 1.2.    Assessment:      Active Hospital Problems    Diagnosis  POA    ESRD (end stage renal disease) on dialysis [N18.6, Z99.2]  Not Applicable      Resolved Hospital Problems   No resolved problems to display.       Plan:      Fistulogram  today.  Risks explained, consent signed.

## 2019-08-27 NOTE — PROCEDURES
[facility] Interventional Nephrology Service     Fistulogram Procedure Report     Date of Procedure:  08/27/2019 12:02 PM     Procedures Performed: Fistulogram with angioplasty to the swing segment     Indication: increase bleeding time and venous pressures     Referring Provider: dr. Wiley and Rolando [facility]     Primary Surgeon: Edith Dinero MD     Assist: Gm Kearns MD     Medications Administered:   Versed 1 mg   Fentanyl 50 mcg  Lidocaine 1%     Procedure Report in Detail:   After informed consent was obtained the patient was prepped and draped in the usual sterile fashion. Her right upper brachiobasilic fistula was cannulated in the venous facing direction with a micropuncture system, confirmed in correct placement under fluroscopy, and a fistulogram was performed using iodinated contrast. Fistulogram revealed severe > 90% stenotic lesion located in swing segment/venous outflow. Central vasculature was then evaluated with contrasted film revealing patent vasculature. Reflux arteriogram revealed patent JA region and brachial artery. More than 6cm of the brachial artery was visualized and it was patent. The micropuncture sheath was cannulated with a glidewire which was advanced to the central circulation, and the micropuncture sheath was removed. A 6 Fr sheath was inserted over the glidewire, and the following interventions were performed after the patient was given  1 mg versed and 50 mcg fentanyl: using a 7 x 40 mm pta balloon and endeflator, we advanced the balloon to the swing segment and inflated to full effacement with waist seen on balloon prior to full effacement. Follow up film revealed resolution of the lesion. The sheath was pulled, and pressure was used to achieve hemostasis. No complications occurred during the procedure.     Estimated blood loss: < 10 ml     Estimated contrast used: 18     Impression/Plan:   This was a successful fistulogram with angioplasty to swing segment performed in the  RUE brachiobasilic region of the fistula. We will have the patient return in 4 weeks for US. Please do refer the patient back if there are any further signs of stenosis (increased venous pressure alarms, prolonged bleeding or worsening adequacy).     Edith Dinero MD  [phone]

## 2019-08-27 NOTE — BRIEF OP NOTE
[facility] Cath Lab [facility] 1  Brief Operative Note     SUMMARY     Surgery Date: 8/27/2019     Surgeon(s) and Role:     * Edith Dinero MD - Primary    Assisting Surgeon: Gm Kearns MD    Pre-op Diagnosis:  ESRD (end stage renal disease) on dialysis [N18.6, Z99.2]    Post-op Diagnosis:  Post-Op Diagnosis Codes:     * ESRD (end stage renal disease) on dialysis [N18.6, Z99.2]    Procedure(s) (LRB):  FISTULOGRAM (N/A)    Anesthesia: RN IV Sedation    Description of the findings of the procedure: The patient was prepped and draped in a sterile fashion.  This was a successful fistulogram of the RUE brachiocephalic AVF with angioplasty to the swing segment/venous outflow.  No complications occurred during the procedure and the patient was transferred to the post-operative nursing floor for recovery.      Findings/Key Components:  The patient was prepped and draped in a sterile fashion.  This was a successful fistulogram of the RUE brachiocephalic AVF with angioplasty to the swing segment/venous outflow.  No complications occurred during the procedure and the patient was transferred to the post-operative nursing floor for recovery.      Estimated Blood Loss: * No values recorded between 8/27/2019 11:40 AM and 8/27/2019 11:56 AM *         Specimens:   Specimen (12h ago, onward)    None          Discharge Note    SUMMARY     Admit Date: 8/27/2019    Discharge Date and Time:  08/27/2019 12:01 PM    Hospital Course (synopsis of major diagnoses, care, treatment, and services provided during the course of the hospital stay):   The patient was prepped and draped in a sterile fashion.  This was a successful fistulogram of the RUE brachiocephalic AVF with angioplasty to the swing segment/venous outflow.  No complications occurred during the procedure and the patient was transferred to the post-operative nursing floor for recovery.      Final Diagnosis: Post-Op Diagnosis Codes:     * ESRD (end stage renal disease) on  dialysis [N18.6, Z99.2]    Disposition: Home or Self Care    Follow Up/Patient Instructions:     Medications:  Reconciled Home Medications:      Medication List      CONTINUE taking these medications    albuterol 0.63 mg/3 mL Nebu  Commonly known as:  ACCUNEB  Take 0.63 mg by nebulization every 6 (six) hours as needed. Rescue     butalbital-acetaminophen-caffeine -40 mg -40 mg per tablet  Commonly known as:  FIORICET, ESGIC     calcium acetate 667 mg capsule  Commonly known as:  PHOSLO  Take 667 mg by mouth 3 (three) times daily with meals.     folic acid 800 MCG Tab  Commonly known as:  FOLVITE  Take 800 mcg by mouth once daily.     gabapentin 100 MG capsule  Commonly known as:  NEURONTIN  Take 100 mg by mouth 2 (two) times daily.     insulin glargine (TOUJEO) 300 unit/mL (1.5 mL) Inpn pen  Commonly known as:  TOUJEO  Inject 12 Units into the skin every evening.     meclizine 25 mg tablet  Commonly known as:  ANTIVERT  3 (three) times daily as needed.     pravastatin 40 MG tablet  Commonly known as:  PRAVACHOL  Take 40 mg by mouth once daily.     promethazine 12.5 MG Tab  Commonly known as:  PHENERGAN  Take 12.5 mg by mouth every 6 (six) hours as needed.     torsemide 20 MG Tab  Commonly known as:  DEMADEX     warfarin 10 MG tablet  Commonly known as:  COUMADIN  Take 7.5 mg by mouth once daily.     zolpidem 10 mg Tab  Commonly known as:  AMBIEN  Take 5 mg by mouth nightly as needed.          Discharge Procedure Orders   Call MD for:  temperature >100.4     Call MD for:  severe uncontrolled pain     Call MD for:  redness, tenderness, or signs of infection (pain, swelling, redness, odor or green/yellow discharge around incision site)     Call MD for:   Order Comments: bleeding     Activity as tolerated

## 2019-10-01 ENCOUNTER — OFFICE VISIT (OUTPATIENT)
Dept: NEPHROLOGY | Facility: CLINIC | Age: 74
End: 2019-10-01
Payer: MEDICARE

## 2019-10-01 VITALS
HEART RATE: 82 BPM | BODY MASS INDEX: 24.62 KG/M2 | OXYGEN SATURATION: 98 % | SYSTOLIC BLOOD PRESSURE: 111 MMHG | TEMPERATURE: 98 F | RESPIRATION RATE: 17 BRPM | WEIGHT: 144.19 LBS | DIASTOLIC BLOOD PRESSURE: 65 MMHG | HEIGHT: 64 IN

## 2019-10-01 DIAGNOSIS — Z99.2 ESRD (END STAGE RENAL DISEASE) ON DIALYSIS: ICD-10-CM

## 2019-10-01 DIAGNOSIS — N18.6 ESRD (END STAGE RENAL DISEASE) ON DIALYSIS: ICD-10-CM

## 2019-10-01 PROCEDURE — 99213 OFFICE O/P EST LOW 20 MIN: CPT | Mod: PBBFAC

## 2019-10-01 PROCEDURE — 99999 PR PBB SHADOW E&M-EST. PATIENT-LVL III: CPT | Mod: PBBFAC,,,

## 2019-10-01 PROCEDURE — 99213 PR OFFICE/OUTPT VISIT, EST, LEVL III, 20-29 MIN: ICD-10-PCS | Mod: S$PBB,,, | Performed by: INTERNAL MEDICINE

## 2019-10-01 PROCEDURE — 99999 PR PBB SHADOW E&M-EST. PATIENT-LVL III: ICD-10-PCS | Mod: PBBFAC,,,

## 2019-10-01 PROCEDURE — 99213 OFFICE O/P EST LOW 20 MIN: CPT | Mod: S$PBB,,, | Performed by: INTERNAL MEDICINE

## 2019-10-01 NOTE — PROGRESS NOTES
[facility] Interventional Nephrology Follow-up Exam    Date:   10/01/2019 9:08 AM    HPI:  73 year old woman with ESRD (MWF at [facility] with Dr. Guillen), well known to the service, required extensive interventions in the past to maintain patency of her RUE brachiobasilic AV fistula.  Last underwent fistulogram with venous angioplasty of the swing segment on 8/27/19.  Here for follow up exam and ultrasound.  She has had no recent issues with prolonged bleeding, difficulty in cannulation or increased frequency of pressure alarms while on dialysis.    Vitals:    10/01/19 0920   BP: 111/65   Pulse: 82   Resp: 17   Temp: 97.5 °F (36.4 °C)     Exam:   ACCESS:  RUE brachiobasilic AV fistula with soft systolic thrill and moderate pulsatility, appropriate pulse augmentation and minimal collapse with arm elevation.    Ultrasound:  - arterial anastomosis patent without stenosis, measured at 6.5 mm  - mid-AVF patent with no stenosis seen, vessel diameter measured at 7 mm  - AVF outflow tract patent, but with significant stenosis at the swing segment, measured from 7 to 3 mm    Impression/Plan:  Stenosis seen within the swing segment.  We will have the patient return for fistulogram next Thursday.  She will hold her coumadin for 2 days prior (will discuss with Dr. Cuevas) and check an INR the morning of the procedure.  At that time, we will likely do angioplasty with DCB if we see a stenotic region at the swing segment.      Stan Wiley MD  [phone]

## 2019-10-10 ENCOUNTER — HOSPITAL ENCOUNTER (OUTPATIENT)
Facility: OTHER | Age: 74
Discharge: HOME | End: 2019-10-10
Attending: INTERNAL MEDICINE | Admitting: INTERNAL MEDICINE
Payer: MEDICARE

## 2019-10-10 VITALS
WEIGHT: 143 LBS | RESPIRATION RATE: 16 BRPM | HEIGHT: 64 IN | TEMPERATURE: 98 F | SYSTOLIC BLOOD PRESSURE: 125 MMHG | DIASTOLIC BLOOD PRESSURE: 67 MMHG | OXYGEN SATURATION: 100 % | BODY MASS INDEX: 24.41 KG/M2 | HEART RATE: 75 BPM

## 2019-10-10 DIAGNOSIS — N18.6 ESRD (END STAGE RENAL DISEASE): ICD-10-CM

## 2019-10-10 DIAGNOSIS — T82.590A MECHANICAL COMPLICATION OF ARTERIOVENOUS FISTULA SURGICALLY CREATED, INITIAL ENCOUNTER: Primary | ICD-10-CM

## 2019-10-10 LAB
INR PPP: 1.1 (ref 0.8–1.2)
POCT GLUCOSE: 110 MG/DL (ref 70–110)
PROTHROMBIN TIME: 12.4 SEC (ref 9–12.5)

## 2019-10-10 PROCEDURE — 85610 PROTHROMBIN TIME: CPT

## 2019-10-10 PROCEDURE — C2623 CATH, TRANSLUMIN, DRUG-COAT: HCPCS | Performed by: INTERNAL MEDICINE

## 2019-10-10 PROCEDURE — 25000003 PHARM REV CODE 250: Performed by: INTERNAL MEDICINE

## 2019-10-10 PROCEDURE — 63600175 PHARM REV CODE 636 W HCPCS: Performed by: INTERNAL MEDICINE

## 2019-10-10 PROCEDURE — 36902 INTRO CATH DIALYSIS CIRCUIT: CPT | Performed by: INTERNAL MEDICINE

## 2019-10-10 PROCEDURE — 36415 COLL VENOUS BLD VENIPUNCTURE: CPT

## 2019-10-10 PROCEDURE — 27201423 OPTIME MED/SURG SUP & DEVICES STERILE SUPPLY: Performed by: INTERNAL MEDICINE

## 2019-10-10 PROCEDURE — 82962 GLUCOSE BLOOD TEST: CPT | Performed by: INTERNAL MEDICINE

## 2019-10-10 PROCEDURE — C1769 GUIDE WIRE: HCPCS | Performed by: INTERNAL MEDICINE

## 2019-10-10 PROCEDURE — 99152 MOD SED SAME PHYS/QHP 5/>YRS: CPT | Performed by: INTERNAL MEDICINE

## 2019-10-10 PROCEDURE — C1725 CATH, TRANSLUMIN NON-LASER: HCPCS | Performed by: INTERNAL MEDICINE

## 2019-10-10 PROCEDURE — 25500020 PHARM REV CODE 255: Performed by: INTERNAL MEDICINE

## 2019-10-10 PROCEDURE — C1894 INTRO/SHEATH, NON-LASER: HCPCS | Performed by: INTERNAL MEDICINE

## 2019-10-10 RX ORDER — FENTANYL CITRATE 50 UG/ML
INJECTION, SOLUTION INTRAMUSCULAR; INTRAVENOUS
Status: DISCONTINUED | OUTPATIENT
Start: 2019-10-10 | End: 2019-10-10 | Stop reason: HOSPADM

## 2019-10-10 RX ORDER — DIVALPROEX SODIUM 125 MG/1
125 TABLET, DELAYED RELEASE ORAL 2 TIMES DAILY
COMMUNITY

## 2019-10-10 RX ORDER — MIDAZOLAM HYDROCHLORIDE 1 MG/ML
INJECTION, SOLUTION INTRAMUSCULAR; INTRAVENOUS
Status: DISCONTINUED | OUTPATIENT
Start: 2019-10-10 | End: 2019-10-10 | Stop reason: HOSPADM

## 2019-10-10 RX ORDER — LIDOCAINE HYDROCHLORIDE 10 MG/ML
INJECTION INFILTRATION; PERINEURAL
Status: DISCONTINUED | OUTPATIENT
Start: 2019-10-10 | End: 2019-10-10 | Stop reason: HOSPADM

## 2019-10-10 RX ORDER — LIDOCAINE HYDROCHLORIDE 10 MG/ML
1 INJECTION, SOLUTION EPIDURAL; INFILTRATION; INTRACAUDAL; PERINEURAL ONCE
Status: DISCONTINUED | OUTPATIENT
Start: 2019-10-10 | End: 2019-10-10 | Stop reason: HOSPADM

## 2019-10-10 RX ORDER — SODIUM CHLORIDE 0.9 % (FLUSH) 0.9 %
10 SYRINGE (ML) INJECTION
Status: DISCONTINUED | OUTPATIENT
Start: 2019-10-10 | End: 2019-10-10 | Stop reason: HOSPADM

## 2019-10-10 NOTE — BRIEF OP NOTE
[facility] Cath Lab [facility] 1  Brief Operative Note     SUMMARY     Surgery Date: 10/10/2019     Surgeon(s) and Role:     * Stan Wiley MD - Primary    Assisting Surgeon: Madonna Wall MD    Pre-op Diagnosis:  ESRD (end stage renal disease) [N18.6]    Post-op Diagnosis:  Post-Op Diagnosis Codes:     * ESRD (end stage renal disease) [N18.6]    Procedure(s) (LRB):  FISTULOGRAM (Right)    Anesthesia: 1% lidocaine, 1 mg versed, 50 mcg fentanyl    Description of the findings of the procedure: Patient was prepped and draped in a sterile fashion.  This was a successful fistulogram of her RUE brachiobasilic AV fistula with angioplasty (and drug-coated balloon angioplasty) performed in the swing segment portion of the fistula.  Patient tolerated the procedure well and no immediate complications noted.      Findings/Key Components: Patient was prepped and draped in a sterile fashion.  This was a successful fistulogram of her RUE brachiobasilic AV fistula with angioplasty (and drug-coated balloon angioplasty) performed in the swing segment portion of the fistula.  Patient tolerated the procedure well and no immediate complications noted.      Estimated Blood Loss: < 10 mL         Specimens:   Specimen (12h ago, onward)    None          Discharge Note    SUMMARY     Admit Date: 10/10/2019    Discharge Date and Time:  10/10/2019 12:00 PM    Hospital Course (synopsis of major diagnoses, care, treatment, and services provided during the course of the hospital stay): Patient was prepped and draped in a sterile fashion.  This was a successful fistulogram of her RUE brachiobasilic AV fistula with angioplasty (and drug-coated balloon angioplasty) performed in the swing segment portion of the fistula.  Patient tolerated the procedure well and no immediate complications noted.       Final Diagnosis: Post-Op Diagnosis Codes:     * ESRD (end stage renal disease) [N18.6]    Disposition: Home or Self Care    Follow Up/Patient Instructions:      Medications:  Reconciled Home Medications:      Medication List      CONTINUE taking these medications    albuterol 0.63 mg/3 mL Nebu  Commonly known as:  ACCUNEB  Take 0.63 mg by nebulization every 6 (six) hours as needed. Rescue     butalbital-acetaminophen-caffeine -40 mg -40 mg per tablet  Commonly known as:  FIORICET, ESGIC     calcium acetate 667 mg capsule  Commonly known as:  PHOSLO  Take 667 mg by mouth 3 (three) times daily with meals.     divalproex 125 MG EC tablet  Commonly known as:  DEPAKOTE  Take 125 mg by mouth 2 (two) times daily.     folic acid 800 MCG Tab  Commonly known as:  FOLVITE  Take 800 mcg by mouth once daily.     gabapentin 100 MG capsule  Commonly known as:  NEURONTIN  Take 100 mg by mouth once daily.     insulin glargine (TOUJEO) 300 unit/mL (1.5 mL) Inpn pen  Commonly known as:  TOUJEO  Inject 12 Units into the skin every evening.     meclizine 25 mg tablet  Commonly known as:  ANTIVERT  3 (three) times daily as needed.     pravastatin 40 MG tablet  Commonly known as:  PRAVACHOL  Take 40 mg by mouth once daily.     torsemide 20 MG Tab  Commonly known as:  DEMADEX     warfarin 10 MG tablet  Commonly known as:  COUMADIN  Take 7.5 mg by mouth once daily.     zolpidem 10 mg Tab  Commonly known as:  AMBIEN  Take 5 mg by mouth nightly as needed.          Discharge Procedure Orders   Lifting restrictions   Order Comments: No heavy lifting for 24 hours.     Call MD for:  temperature >100.4     Call MD for:  severe uncontrolled pain     Call MD for:  redness, tenderness, or signs of infection (pain, swelling, redness, odor or green/yellow discharge around incision site)     Call MD for:   Order Comments: Bleeding from the access site.     Activity as tolerated     Follow-up Information     [facility] In 1 day.    Why:  for dialysis  Contact information:  [facility] [address] [facility] [address] [phone]

## 2019-10-10 NOTE — H&P
[facility] Cath Lab [place] 1  History & Physical    Subjective:      Chief Complaint/Reason for Admission: Here for fistulogram    Jose Francisco Martines is a 73 y.o. female with ESRD (MWF at [facility] with Dr. Guillen) who presents today for fistulogram after recent routine follow up ultrasound showed a clear stenosis within the swing segment of her fistula.  Of note, her last fistulogram was approximately two months prior.    Past Medical History:   Diagnosis Date    Asthma     Cardiac arrhythmia     Clotting disorder     Diabetes mellitus     End stage renal failure on dialysis     Hypertension      Past Surgical History:   Procedure Laterality Date    APPENDECTOMY      CARDIAC VALVE REPLACEMENT      FISTULOGRAM Right 2/21/2019    Procedure: FISTULOGRAM;  Surgeon: Stan Wiley MD;  Location: [facility] CATH LAB;  Service: Nephrology;  Laterality: Right;    FISTULOGRAM N/A 2/28/2019    Procedure: FISTULOGRAM;  Surgeon: Stan Wiley MD;  Location: [facility] CATH LAB;  Service: Nephrology;  Laterality: N/A;  off coumadin x3 days prior to procedure    FISTULOGRAM Right 7/15/2019    Procedure: Fistulogram;  Surgeon: Stan Wiley MD;  Location: [facility] CATH LAB;  Service: Nephrology;  Laterality: Right;    FISTULOGRAM Right 8/27/2019    Procedure: FISTULOGRAM;  Surgeon: Edith Dinero MD;  Location: [facility] CATH LAB;  Service: Nephrology;  Laterality: Right;    OOPHORECTOMY      THROMBECTOMY OF HEMODIALYSIS ACCESS SITE N/A 7/15/2019    Procedure: THROMBECTOMY, HEMODIALYSIS GRAFT OR FISTULA;  Surgeon: Stan Wiley MD;  Location: [facility] CATH LAB;  Service: Nephrology;  Laterality: N/A;     History reviewed. No pertinent family history.  Social History     Tobacco Use    Smoking status: Never Smoker    Smokeless tobacco: Never Used   Substance Use Topics    Alcohol use: Not on file    Drug use: Not on file       PTA Medications   Medication Sig    butalbital-acetaminophen-caffeine -40 mg (FIORICET, ESGIC)  -40 mg per tablet     calcium acetate (PHOSLO) 667 mg capsule Take 667 mg by mouth 3 (three) times daily with meals.    divalproex (DEPAKOTE) 125 MG EC tablet Take 125 mg by mouth 2 (two) times daily.    folic acid (FOLVITE) 800 MCG Tab Take 800 mcg by mouth once daily.    gabapentin (NEURONTIN) 100 MG capsule Take 100 mg by mouth once daily.     insulin glargine, TOUJEO, (TOUJEO) 300 unit/mL (1.5 mL) InPn pen Inject 12 Units into the skin every evening.     meclizine (ANTIVERT) 25 mg tablet 3 (three) times daily as needed.     pravastatin (PRAVACHOL) 40 MG tablet Take 40 mg by mouth once daily.    zolpidem (AMBIEN) 10 mg Tab Take 5 mg by mouth nightly as needed.    albuterol (ACCUNEB) 0.63 mg/3 mL Nebu Take 0.63 mg by nebulization every 6 (six) hours as needed. Rescue    torsemide (DEMADEX) 20 MG Tab     warfarin (COUMADIN) 10 MG tablet Take 7.5 mg by mouth once daily.      Review of patient's allergies indicates:   Allergen Reactions    Codeine Hives     Head aches, nausea, itch    Amitriptyline      Causes severe headache.    Topiramate Other (See Comments)        Review of Systems   Constitutional: Negative.    Respiratory: Negative.    Cardiovascular: Negative.        Objective:      Vital Signs (Most Recent)  Temp: 97.9 °F (36.6 °C) (10/10/19 1032)  Pulse: 79 (10/10/19 1032)  Resp: 18 (10/10/19 1032)  BP: (!) 106/58 (10/10/19 1032)  SpO2: 100 % (10/10/19 1032)    Vital Signs Range (Last 24H):  Temp:  [97.9 °F (36.6 °C)]   Pulse:  [79]   Resp:  [18]   BP: (106)/(58)   SpO2:  [100 %]     Physical Exam   Constitutional: She is oriented to person, place, and time. She appears well-developed and well-nourished. No distress.   HENT:   Head: Normocephalic and atraumatic.   Eyes: EOM are normal.   Neck: Neck supple.   Pulmonary/Chest: Effort normal. No respiratory distress.   Musculoskeletal:   RUE brachiobasilic AVF with aneurysmal portion near the anastomosis, unchanged in size with no impending  risk of rupture, clear difference in exam near the presumed stenotic area (moderate pulsatility in the proximal region and no pulsatility with good thrill distally).  Appropriate pulse augmentation.   Neurological: She is alert and oriented to person, place, and time.   Skin: Skin is warm and dry. She is not diaphoretic.   Psychiatric: She has a normal mood and affect. Her behavior is normal.       Assessment:      Active Hospital Problems    Diagnosis  POA    ESRD (end stage renal disease) [N18.6]  Yes      Resolved Hospital Problems   No resolved problems to display.       Plan:      Fistulogram today.  Risks explained, consent signed.  INR 1.1 this morning.

## 2019-10-10 NOTE — PROCEDURES
[facility] Interventional Nephrology Service    Fistulogram Procedure Report    Date of Procedure:  10/10/2019 12:01 PM    Procedures Performed: Fistulogram with venous angioplasty of the swing segment, drug-coated balloon angioplasty of the same stenosis, reflux arteriogram    Indication: Stenosis seen on routine follow up ultrasound    Referring Provider: Dr. Guillen and CANDIE    Primary Surgeon: Stan Wiley MD  Assist: Madonna Wall MD    Medications Administered:  1% lidocaine, 1 mg versed, 50 mcg fentanyl    Procedure Report in Detail:  After informed consent was obtained the patient was prepped and draped in the usual sterile fashion.  Her right upper brachiobasilic AVF was cannulated in the venous facing direction with a micropuncture system, confirmed in correct placement under fluroscopy, and a fistulogram was performed using iodinated contrast.  Fistulogram revealed severe stenosis (approximately 80%) stenosis in the swing segment.  Central vasculature was then evaluated with contrasted film revealing widely patent central vasculature.  Reflux arteriogram revealed patent anastomosis with no juxta-anastomosis stenosis.  More than 6cm of the brachial artery was visualized and it was patent.  The micropuncture sheath was cannulated with a glidewire which was advanced to the central circulation, and the micropuncture sheath was removed.  A 6 Fr sheath was inserted over the glidewire, and the following interventions were performed after the patient was given 1 mg versed and 50 mcg fentanyl: using a 8 x 40 mm conquest pta balloon and endeflator, we advanced the balloon to the swing segment and inflated to full effacement with waist seen on balloon prior to full effacement.  This was held fully effaced for 1 minute, after which follow up film revealed complete resolution of the stenosis with good flow of contrast and no evidence of extravasation.  Given her likelihood of recurrence and the difficulty and risk with  which we have to titrate her coumadin prior to these procedures, the decision was made to use a Lutonix drug coated balloon to treat the lesion.  We advanced the DCB to the swing segment and inflated to full effacement with no waist seen prior to full effacement, this was held effaced for 3 minutes, after which a repeat fistulogram revealed no recurrence in her stenosis, with good flow of contrast and no evidence of extravasation.      The sheath was pulled, a #2 ethilon suture was used to achieve hemostasis.  No complications occurred during the procedure.     Estimated blood loss: < 10 mL   Estimated contrast used: 12 mL    Impression/Plan:  This was a successful fistulogram with angioplasty (and DCB) performed in the swing segment region of the fistula.  We will have the patient return in 1 week for suture removal and follow up exam.  Please do refer the patient back if there are any further signs of stenosis (increased venous pressure alarms, prolonged bleeding or worsening adequacy).  Hopefully this DCB angioplasty will prevent rapid recurrence of her stenosis, we may consider additional use in the future as well as potential stenting in the far future to maintain patency of this fistula.      Stan Wiley MD  [phone]

## 2019-10-15 ENCOUNTER — OFFICE VISIT (OUTPATIENT)
Dept: NEPHROLOGY | Facility: CLINIC | Age: 74
End: 2019-10-15
Payer: MEDICARE

## 2019-10-15 VITALS
BODY MASS INDEX: 24.32 KG/M2 | RESPIRATION RATE: 16 BRPM | WEIGHT: 142.44 LBS | HEIGHT: 64 IN | OXYGEN SATURATION: 99 % | SYSTOLIC BLOOD PRESSURE: 105 MMHG | HEART RATE: 77 BPM | DIASTOLIC BLOOD PRESSURE: 63 MMHG | TEMPERATURE: 97 F

## 2019-10-15 DIAGNOSIS — T82.590S MECHANICAL COMPLICATION OF ARTERIOVENOUS FISTULA SURGICALLY CREATED, SEQUELA: Primary | ICD-10-CM

## 2019-10-15 PROCEDURE — 99213 OFFICE O/P EST LOW 20 MIN: CPT | Mod: S$PBB,,, | Performed by: INTERNAL MEDICINE

## 2019-10-15 PROCEDURE — 99213 PR OFFICE/OUTPT VISIT, EST, LEVL III, 20-29 MIN: ICD-10-PCS | Mod: S$PBB,,, | Performed by: INTERNAL MEDICINE

## 2019-10-15 NOTE — PROGRESS NOTES
[facility] Interventional Nephrology Follow-up Exam    Date:   10/15/2019 9:08 AM    HPI:  73 year old woman with ESRD (MWF at [facility] with Dr. Guillen), well known to the service, required extensive interventions in the past to maintain patency of her RUE brachiobasilic AV fistula.  Last underwent fistulogram with venous angioplasty of the swing segment on 10/10 requiring DCB.  Here for follow up exam and ultrasound.  She has had no recent issues with prolonged bleeding, difficulty in cannulation or increased frequency of pressure alarms while on dialysis.    There were no vitals filed for this visit.  Exam:   ACCESS:  RUE brachiobasilic AV fistula with soft systolic thrill and moderate pulsatility, appropriate pulse augmentation and minimal collapse with arm elevation.    Ultrasound:  - arterial anastomosis patent without stenosis, measured at 7 mm  - mid-AVF patent with no stenosis seen, vessel diameter measured at 7 mm  - AVF outflow tract patent, but with mild stenosis at the swing segment, measured from 7 to 6 mm    Impression/Plan:  Stenosis seen within the swing segment.  We will have the patient return for routine US. Please refer earlier for any indication for fistulogram or impending thrombosis.    Edith Dinero MD  [phone]

## 2020-02-29 ENCOUNTER — OFFICE VISIT (OUTPATIENT)
Dept: URGENT CARE | Facility: CLINIC | Age: 75
End: 2020-02-29
Payer: MEDICARE

## 2020-02-29 VITALS
BODY MASS INDEX: 25.61 KG/M2 | HEIGHT: 64 IN | DIASTOLIC BLOOD PRESSURE: 64 MMHG | OXYGEN SATURATION: 94 % | SYSTOLIC BLOOD PRESSURE: 117 MMHG | HEART RATE: 77 BPM | WEIGHT: 150 LBS | TEMPERATURE: 97 F

## 2020-02-29 DIAGNOSIS — J32.9 BACTERIAL SINUSITIS: ICD-10-CM

## 2020-02-29 DIAGNOSIS — B96.89 BACTERIAL SINUSITIS: ICD-10-CM

## 2020-02-29 DIAGNOSIS — J40 BRONCHITIS: Primary | ICD-10-CM

## 2020-02-29 PROCEDURE — 99213 PR OFFICE/OUTPT VISIT, EST, LEVL III, 20-29 MIN: ICD-10-PCS | Mod: S$GLB,,, | Performed by: NURSE PRACTITIONER

## 2020-02-29 PROCEDURE — 99213 OFFICE O/P EST LOW 20 MIN: CPT | Mod: S$GLB,,, | Performed by: NURSE PRACTITIONER

## 2020-02-29 RX ORDER — AZELASTINE 1 MG/ML
1 SPRAY, METERED NASAL 2 TIMES DAILY
Qty: 30 ML | Refills: 0 | Status: SHIPPED | OUTPATIENT
Start: 2020-02-29 | End: 2021-02-28

## 2020-02-29 RX ORDER — PROMETHAZINE HYDROCHLORIDE 12.5 MG/1
TABLET ORAL
COMMUNITY
Start: 2020-01-16

## 2020-02-29 RX ORDER — AZITHROMYCIN 250 MG/1
250 TABLET, FILM COATED ORAL DAILY
Qty: 6 TABLET | Refills: 0 | Status: SHIPPED | OUTPATIENT
Start: 2020-02-29 | End: 2020-03-06

## 2020-02-29 NOTE — PROGRESS NOTES
"Subjective:       Patient ID: Jose Francisco Martines is a 74 y.o. female.    Vitals:  height is 5' 4" (1.626 m) and weight is 68 kg (150 lb). Her oral temperature is 97.2 °F (36.2 °C). Her blood pressure is 117/64 and her pulse is 77. Her oxygen saturation is 94% (abnormal).     Chief Complaint: URI    URI    This is a new problem. The current episode started in the past 7 days (Thursday). The problem has been unchanged. There has been no fever. Associated symptoms include congestion, coughing, headaches, rhinorrhea, sinus pain and a sore throat. Pertinent negatives include no abdominal pain, chest pain, diarrhea, dysuria, ear pain, joint pain, joint swelling, nausea, neck pain, plugged ear sensation, rash, sneezing, swollen glands, vomiting or wheezing. She has tried acetaminophen for the symptoms. The treatment provided mild relief.       Constitution: Negative for chills, sweating, fatigue and fever.   HENT: Positive for congestion, sinus pain and sore throat. Negative for ear pain, sinus pressure and voice change.    Neck: Negative for neck pain and painful lymph nodes.   Cardiovascular: Negative for chest pain.   Eyes: Negative for eye redness.   Respiratory: Positive for cough. Negative for chest tightness, sputum production, bloody sputum, COPD, shortness of breath, stridor, wheezing and asthma.    Gastrointestinal: Negative for abdominal pain, nausea, vomiting and diarrhea.   Genitourinary: Negative for dysuria.   Musculoskeletal: Negative for muscle ache.   Skin: Negative for rash.   Allergic/Immunologic: Negative for seasonal allergies, asthma and sneezing.   Neurological: Positive for headaches.   Hematologic/Lymphatic: Negative for swollen lymph nodes.       Objective:      Physical Exam   Constitutional: She is oriented to person, place, and time. She appears well-developed and well-nourished. She is cooperative.  Non-toxic appearance. She does not appear ill. No distress.   HENT:   Head: Normocephalic and " atraumatic.   Right Ear: Hearing, external ear and ear canal normal. Tympanic membrane is erythematous.   Left Ear: Hearing, external ear and ear canal normal. Tympanic membrane is erythematous.   Nose: Mucosal edema and rhinorrhea present. No nasal deformity. No epistaxis. Right sinus exhibits frontal sinus tenderness. Right sinus exhibits no maxillary sinus tenderness. Left sinus exhibits frontal sinus tenderness. Left sinus exhibits no maxillary sinus tenderness.   Mouth/Throat: Uvula is midline and mucous membranes are normal. No trismus in the jaw. Normal dentition. No uvula swelling. Posterior oropharyngeal erythema present.   Eyes: Conjunctivae and lids are normal. Right eye exhibits no discharge. Left eye exhibits no discharge. No scleral icterus.   Neck: Trachea normal, normal range of motion, full passive range of motion without pain and phonation normal. Neck supple.   Cardiovascular: Normal rate, regular rhythm, normal heart sounds, intact distal pulses and normal pulses.   Pulmonary/Chest: Effort normal and breath sounds normal. No respiratory distress.   Abdominal: Soft. Normal appearance and bowel sounds are normal. She exhibits no distension, no pulsatile midline mass and no mass. There is no tenderness.   Musculoskeletal: Normal range of motion. She exhibits no edema or deformity.   Neurological: She is alert and oriented to person, place, and time. She exhibits normal muscle tone. Coordination normal.   Skin: Skin is warm, dry, intact, not diaphoretic and not pale.   Psychiatric: She has a normal mood and affect. Her speech is normal and behavior is normal. Judgment and thought content normal. Cognition and memory are normal.   Nursing note and vitals reviewed.        Assessment:       1. Bronchitis    2. Bacterial sinusitis        Plan:         Bronchitis  -     azithromycin (Z-JUSTINE) 250 MG tablet; Take 1 tablet (250 mg total) by mouth once daily. Take 2 pills the first day then 1 pill a day for 4  days for 6 doses  Dispense: 6 tablet; Refill: 0    Bacterial sinusitis  -     azithromycin (Z-JUSTINE) 250 MG tablet; Take 1 tablet (250 mg total) by mouth once daily. Take 2 pills the first day then 1 pill a day for 4 days for 6 doses  Dispense: 6 tablet; Refill: 0  -     azelastine (ASTELIN) 137 mcg (0.1 %) nasal spray; 1 spray (137 mcg total) by Nasal route 2 (two) times daily.  Dispense: 30 mL; Refill: 0    Pharmacy called with the interaction with Azithromycin. Changed to Amoxil 250mg every other day for 7 days.      Please drink plenty of fluids.  Please get plenty of rest.  Please return here or go to the Emergency Department for any concerns or worsening of condition.  If you were given wait & see antibiotics, please wait 3-5 days before taking them, and only take them if your symptoms have worsened or not improved.  If you do begin taking the antibiotics, please take them to completion.  If you were prescribed antibiotics, please take them to completion.      If you do have Hypertension or palpitations, it is safe to take Coricidin HBP for relief of sinus symptoms.  We recommend you take over the counter Flonase (Fluticasone) or another nasally inhaled steroid unless you are already taking one.  Nasal irrigation with a saline spray or Netti Pot like device per their directions is also recommended.  If not allergic, please take over the counter Tylenol (Acetaminophen) and/or Motrin (Ibuprofen) as directed for control of pain and/or fever.  Please follow up with your primary care doctor or specialist as needed.    If you  smoke, please stop smoking.

## 2020-05-31 ENCOUNTER — OFFICE VISIT (OUTPATIENT)
Dept: URGENT CARE | Facility: CLINIC | Age: 75
End: 2020-05-31
Payer: MEDICARE

## 2020-05-31 VITALS
DIASTOLIC BLOOD PRESSURE: 86 MMHG | TEMPERATURE: 97 F | WEIGHT: 150 LBS | HEIGHT: 64 IN | HEART RATE: 81 BPM | OXYGEN SATURATION: 96 % | RESPIRATION RATE: 17 BRPM | BODY MASS INDEX: 25.61 KG/M2 | SYSTOLIC BLOOD PRESSURE: 131 MMHG

## 2020-05-31 DIAGNOSIS — L02.91 ABSCESS: Primary | ICD-10-CM

## 2020-05-31 PROCEDURE — 99214 OFFICE O/P EST MOD 30 MIN: CPT | Mod: S$GLB,,, | Performed by: NURSE PRACTITIONER

## 2020-05-31 PROCEDURE — 99214 PR OFFICE/OUTPT VISIT, EST, LEVL IV, 30-39 MIN: ICD-10-PCS | Mod: S$GLB,,, | Performed by: NURSE PRACTITIONER

## 2020-05-31 RX ORDER — DOXYCYCLINE 100 MG/1
100 CAPSULE ORAL 2 TIMES DAILY
Qty: 20 CAPSULE | Refills: 0 | Status: SHIPPED | OUTPATIENT
Start: 2020-05-31 | End: 2020-06-10

## 2020-05-31 RX ORDER — MUPIROCIN 20 MG/G
OINTMENT TOPICAL
Qty: 22 G | Refills: 3 | Status: SHIPPED | OUTPATIENT
Start: 2020-05-31

## 2020-05-31 NOTE — PATIENT INSTRUCTIONS
Wash area gently with a mild soap and water.  Ointment to both nostrils 2-3 times daily.  Warm compresses as needed for comfort.  Do not pick at area.  Follow up closely with your PCP who may refer you to an ENT or dental specialist.  Antibiotic as directed with food until completion.  Go to the ER for any rapid swelling or difficulty swallowing.  Abscess (Antibiotic Treatment Only)  An abscess (sometimes called a boil) happens when bacteria get trapped under the skin and start to grow. Pus forms inside the abscess as the body responds to the bacteria. An abscess can happen with an insect bite, ingrown hair, blocked oil gland, pimple, cyst, or puncture wound.  In the early stages, your wound may be red and tender. For this stage, you may get antibiotics. If the abscess does not get better with antibiotics, it will need to be drained with a small cut.  Home care  These tips will help you care for your abscess at home:  · Soak the wound in hot water or apply hot packs (small towel soaked in hot water) to the area for 20 minutes at a time. Do this 3 to 4 times a day.  · Do not cut, squeeze, or pop the boil yourself.  · Apply antibiotic cream or ointment to the skin 3 to 4 times a day, unless something else was prescribed. Some ointments include an antibiotic plus a pain reliever.  · If your doctor prescribed antibiotics, do not stop taking them until you have finished the medicine or the doctor tells you to stop.  · You may use an over-the-counter pain medicine to control pain, unless another pain medicine was prescribed. If you have chronic liver or kidney disease or ever had a stomach ulcer or gastrointestinal bleeding, talk with your doctor before using these any of these.  Follow-up care  Follow up with your healthcare provider, or as advised. Check your wound each day for the signs of worsening infection listed below.  When to seek medical advice  Get prompt medical attention if any of these occur:  · An  increase in redness or swelling  · Red streaks in the skin leading away from the abscess  · An increase in local pain or swelling  · Fever of 100.4ºF (38ºC) or higher, or as directed by your healthcare provider  · Pus or fluid coming from the abscess  · Boil returns after getting better  Date Last Reviewed: 9/1/2016  © 7558-6448 [employer]. [address] [place] [address], [place] [address]. All rights reserved. This information is not intended as a substitute for professional medical care. Always follow your healthcare professional's instructions.

## 2020-12-01 ENCOUNTER — OFFICE VISIT (OUTPATIENT)
Dept: URGENT CARE | Facility: CLINIC | Age: 75
End: 2020-12-01
Payer: MEDICARE

## 2020-12-01 VITALS
TEMPERATURE: 98 F | OXYGEN SATURATION: 98 % | DIASTOLIC BLOOD PRESSURE: 74 MMHG | SYSTOLIC BLOOD PRESSURE: 115 MMHG | HEIGHT: 64 IN | BODY MASS INDEX: 25.61 KG/M2 | WEIGHT: 150 LBS | RESPIRATION RATE: 18 BRPM | HEART RATE: 78 BPM

## 2020-12-01 DIAGNOSIS — S80.02XA CONTUSION OF LEFT KNEE, INITIAL ENCOUNTER: Primary | ICD-10-CM

## 2020-12-01 PROCEDURE — 99214 PR OFFICE/OUTPT VISIT, EST, LEVL IV, 30-39 MIN: ICD-10-PCS | Mod: S$GLB,,, | Performed by: NURSE PRACTITIONER

## 2020-12-01 PROCEDURE — 73562 XR KNEE 3 VIEW LEFT: ICD-10-PCS | Mod: LT,S$GLB,, | Performed by: RADIOLOGY

## 2020-12-01 PROCEDURE — 73562 X-RAY EXAM OF KNEE 3: CPT | Mod: LT,S$GLB,, | Performed by: RADIOLOGY

## 2020-12-01 PROCEDURE — 99214 OFFICE O/P EST MOD 30 MIN: CPT | Mod: S$GLB,,, | Performed by: NURSE PRACTITIONER

## 2020-12-01 RX ORDER — METHOCARBAMOL 750 MG/1
750 TABLET, FILM COATED ORAL 3 TIMES DAILY
COMMUNITY
Start: 2020-11-10

## 2020-12-01 RX ORDER — HYDROCODONE BITARTRATE AND ACETAMINOPHEN 5; 325 MG/1; MG/1
1 TABLET ORAL EVERY 6 HOURS PRN
Qty: 5 TABLET | Refills: 0 | Status: SHIPPED | OUTPATIENT
Start: 2020-12-01

## 2020-12-01 NOTE — PATIENT INSTRUCTIONS
Urgent Care Management:  - Treatment plan discussed.  - PCP recommendations given.  - Return precautions advised.  - Patient agrees with and understands plan of care.  -If not contraindicated, Please take Zyrtec, Claritin or Allegra for sinus and URI symptoms with a Nasal Spray.   -If not contraindicated please take tylenol or motrin for headache and pain.  -Stay hydrated, rest, please eat three meals daily and take vitamin C and Zinc.     Patient Instructions, Education, Teaching and Summary of Visit:      RETURN TO CLINIC IF SYMPTOMS WORSEN OR CALL 911 IMMEDIATELY FOR SHORTNESS OF BREATH, CHEST PAIN, DIZZINESS, WORSENING PAIN, NAUSEA AND VOMITING, HEART PALPITATIONS, FEVER AND/OR NECK STIFFNESS. FOLLOW UP WITH PRIMARY CARE PROVIDER IN THE AM.    -Diagnosis and treatment plan discussed with patient.  -Patient agreed with my treatment plan.  -Patient will follow up with primary care provider or Specialty Provider, as discussed.     -If you were prescribed a narcotic or controlled medication, do not drive or operate heavy equipment or machinery while taking these medications.  -You must understand that you've received an Urgent Care treatment only and that you may be released before all your medical problems are known or treated.   -You, the patient, will arrange for follow up care as instructed.  -Follow up with your PCP or specialty clinic as directed in the next 1-2 weeks if not improved or as needed.    -You can call [phone] to schedule an appointment with the appropriate provider.  -If your condition worsens we recommend that you receive another evaluation at the emergency room immediately or contact your primary medical clinics after hours call service to discuss your concerns.  -Please return here or go to the Emergency Department for any concerns or worsening of condition.    Please arrange follow up with your primary medical clinic as soon as possible. You must understand that you've received an  Urgent Care treatment only and that you may be released before all of your medical problems are known or treated. You, the patient, will arrange for follow up as instructed. If your symptoms worsen or fail to improve you should go to the Emergency Room.    WE CANNOT RULE OUT ALL POSSIBLE CAUSES OF YOUR SYMPTOMS IN THE URGENT CARE SETTING PLEASE GO TO THE ER IF YOU FEELS YOUR CONDITION IS WORSENING OR YOU WOULD LIKE EMERGENT EVALUATION.     Please return here or go to the Emergency Department for any concerns or worsening of condition.  If you were prescribed antibiotics, please take them to completion.  If you were prescribed a narcotic medication, do not drive or operate heavy equipment or machinery while taking these medications.  Please follow up with your primary care doctor or specialist as needed.     If you  smoke, please stop smoking.

## 2020-12-01 NOTE — PROGRESS NOTES
"Subjective:       Patient ID: Jose Francisco Martines is a 75 y.o. female.    Vitals:  height is 5' 4" (1.626 m) and weight is 68 kg (150 lb). Her temperature is 98.4 °F (36.9 °C). Her blood pressure is 115/74 and her pulse is 78. Her respiration is 18 and oxygen saturation is 98%.     Chief Complaint: Knee Pain    Pt states injury to left knee 5 days ago. Pt states left knee struck banister while she was on her motorized scooter at [employer].    Knee Pain   The incident occurred 5 to 7 days ago. [place] park. The injury mechanism was a direct blow. The pain is present in the left knee and left thigh. The pain is at a severity of 9/10. The pain has been constant since onset. She reports no foreign bodies present. The symptoms are aggravated by movement and weight bearing. She has tried acetaminophen and ice for the symptoms. The treatment provided mild relief.       Constitution: Negative for chills, fatigue and fever.   HENT: Negative for congestion and sore throat.    Neck: Negative for painful lymph nodes.   Cardiovascular: Negative for chest pain and leg swelling.   Eyes: Negative for double vision and blurred vision.   Respiratory: Negative for cough and shortness of breath.    Gastrointestinal: Negative for nausea, vomiting and diarrhea.   Genitourinary: Negative for dysuria, frequency, urgency and history of kidney stones.   Musculoskeletal: Positive for joint pain and joint swelling. Negative for muscle cramps and muscle ache.   Skin: Negative for color change, pale, rash and bruising.   Allergic/Immunologic: Negative for seasonal allergies.   Neurological: Negative for dizziness, history of vertigo, light-headedness, passing out and headaches.   Hematologic/Lymphatic: Negative for swollen lymph nodes.   Psychiatric/Behavioral: Negative for nervous/anxious, sleep disturbance and depression. The patient is not nervous/anxious.        Objective:      Physical Exam   Constitutional: She is " oriented to person, place, and time. She appears well-developed. She is cooperative.  Non-toxic appearance. She does not appear ill. No distress.   HENT:   Head: Normocephalic and atraumatic.   Ears:   Right Ear: Hearing, tympanic membrane, external ear and ear canal normal.   Left Ear: Hearing, tympanic membrane, external ear and ear canal normal.   Nose: Nose normal. No mucosal edema, rhinorrhea or nasal deformity. No epistaxis. Right sinus exhibits no maxillary sinus tenderness and no frontal sinus tenderness. Left sinus exhibits no maxillary sinus tenderness and no frontal sinus tenderness.   Mouth/Throat: Uvula is midline, oropharynx is clear and moist and mucous membranes are normal. No trismus in the jaw. Normal dentition. No uvula swelling. No posterior oropharyngeal erythema.   Eyes: Conjunctivae and lids are normal. Right eye exhibits no discharge. Left eye exhibits no discharge. No scleral icterus.   Neck: Trachea normal, normal range of motion, full passive range of motion without pain and phonation normal. Neck supple.   Cardiovascular: Normal rate, regular rhythm, normal heart sounds and normal pulses.   Pulmonary/Chest: Effort normal and breath sounds normal. No respiratory distress.   Abdominal: Soft. Normal appearance and bowel sounds are normal. She exhibits no distension, no pulsatile midline mass and no mass. There is no abdominal tenderness.   Musculoskeletal: Normal range of motion.         General: No deformity.      Left knee: She exhibits swelling, ecchymosis and bony tenderness. She exhibits normal range of motion, no effusion, no deformity, no laceration, no erythema, normal alignment, no LCL laxity, normal patellar mobility, normal meniscus and no MCL laxity. Tenderness found. Medial joint line tenderness noted.        Legs:    Neurological: She is alert and oriented to person, place, and time. She exhibits normal muscle tone. Coordination normal.   Skin: Skin is warm, dry, intact, not  diaphoretic and not pale. not left kneePsychiatric: Her speech is normal and behavior is normal. Judgment and thought content normal.   Nursing note and vitals reviewed.        Assessment:       1. Contusion of left knee, initial encounter        Plan:         Contusion of left knee, initial encounter  -     XR KNEE 3 VIEW LEFT; Future; Expected date: 12/01/2020  -     KNEE BRACE FOR HOME USE  -     Ambulatory referral/consult to Orthopedics    Other orders  -     HYDROcodone-acetaminophen (NORCO) 5-325 mg per tablet; Take 1 tablet by mouth every 6 (six) hours as needed for Pain.  Dispense: 5 tablet; Refill: 0

## 2020-12-02 ENCOUNTER — TELEPHONE (OUTPATIENT)
Dept: ORTHOPEDICS | Facility: CLINIC | Age: 75
End: 2020-12-02

## 2020-12-02 NOTE — TELEPHONE ENCOUNTER
Ortho Referral: [id number]  LVM requesting return call regarding Ortho appt for contusion of left knee per HERNANDO Carrillo NP/Dennis  referral.

## 2023-02-22 NOTE — PROGRESS NOTES
"Subjective:       Patient ID: Jose Francisco Martines is a 74 y.o. female.    Vitals:  height is 5' 4" (1.626 m) and weight is 68 kg (150 lb). Her temperature is 97 °F (36.1 °C). Her blood pressure is 131/86 and her pulse is 81. Her respiration is 17 and oxygen saturation is 96%.     Chief Complaint: Sinus Problem    Pt states sinus congestion and pressure x 4 days. Pt states facial swelling x 3 days. Pt states scab in nostril.  Denies drainage or foul smell.  Denies fever or chills.  Denies cough or URI symptoms.  States that the swelling is below the nare.  Denies a history of skin infections.  Lives alone.  Dialysis patient MWF.    Sinus Problem   This is a new problem. The current episode started in the past 7 days. The problem is unchanged. There has been no fever. Associated symptoms include congestion, headaches and sinus pressure. Pertinent negatives include no chills, coughing, diaphoresis, ear pain, hoarse voice, neck pain, shortness of breath, sneezing, sore throat or swollen glands.       Constitution: Negative for chills, sweating, fatigue and fever.   HENT: Positive for congestion and sinus pressure. Negative for ear pain and sore throat.    Neck: Negative for neck pain and painful lymph nodes.   Cardiovascular: Negative for chest pain and leg swelling.   Eyes: Negative for double vision and blurred vision.   Respiratory: Negative for cough and shortness of breath.    Gastrointestinal: Negative for nausea, vomiting and diarrhea.   Genitourinary: Negative for dysuria, frequency, urgency and history of kidney stones.   Musculoskeletal: Negative for joint pain, joint swelling, muscle cramps and muscle ache.   Skin: Negative for color change, pale, rash and bruising.   Allergic/Immunologic: Negative for seasonal allergies and sneezing.   Neurological: Positive for headaches. Negative for dizziness, history of vertigo, light-headedness and passing out.   Hematologic/Lymphatic: Negative for swollen lymph nodes. "   Psychiatric/Behavioral: Negative for nervous/anxious, sleep disturbance and depression. The patient is not nervous/anxious.        Objective:      Physical Exam   Constitutional: She is oriented to person, place, and time. She appears well-developed and well-nourished. She is cooperative.  Non-toxic appearance. She does not have a sickly appearance. She does not appear ill. No distress.   HENT:   Head: Normocephalic and atraumatic.   Right Ear: Hearing, tympanic membrane, external ear and ear canal normal.   Left Ear: Hearing, tympanic membrane, external ear and ear canal normal.   Nose: Sinus tenderness present. No mucosal edema, rhinorrhea, purulent discharge or nasal deformity. No epistaxis. Right sinus exhibits no maxillary sinus tenderness and no frontal sinus tenderness. Left sinus exhibits no maxillary sinus tenderness and no frontal sinus tenderness.       Mouth/Throat: Uvula is midline, oropharynx is clear and moist and mucous membranes are normal. No trismus in the jaw. Normal dentition. No uvula swelling. No oropharyngeal exudate, posterior oropharyngeal edema or posterior oropharyngeal erythema.   No difficulty swallowing.  Speaking in full clear sentences.  No drooling or trismus or foul odor.   Eyes: Conjunctivae and lids are normal. No scleral icterus.   Neck: Trachea normal, full passive range of motion without pain and phonation normal. Neck supple. No neck rigidity. No edema and no erythema present.   Cardiovascular: Normal rate, regular rhythm, normal heart sounds, intact distal pulses and normal pulses.   Pulmonary/Chest: Effort normal and breath sounds normal. No respiratory distress. She has no decreased breath sounds. She has no rhonchi.   Abdominal: Normal appearance.   Musculoskeletal: Normal range of motion. She exhibits no edema or deformity.   Neurological: She is alert and oriented to person, place, and time. She exhibits normal muscle tone. Coordination normal.   Skin: Skin is warm,  No dry, intact, not diaphoretic and not pale.   Psychiatric: She has a normal mood and affect. Her speech is normal and behavior is normal. Judgment and thought content normal. Cognition and memory are normal.   Nursing note and vitals reviewed.        Assessment:       1. Abscess        Plan:         Abscess  -     mupirocin (BACTROBAN) 2 % ointment; Apply to affected area 3 times daily  Dispense: 22 g; Refill: 3  -     doxycycline (MONODOX) 100 MG capsule; Take 1 capsule (100 mg total) by mouth 2 (two) times daily. for 10 days  Dispense: 20 capsule; Refill: 0      Patient Instructions   Wash area gently with a mild soap and water.  Ointment to both nostrils 2-3 times daily.  Warm compresses as needed for comfort.  Do not pick at area.  Follow up closely with your PCP who may refer you to an ENT or dental specialist.  Antibiotic as directed with food until completion.  Go to the ER for any rapid swelling or difficulty swallowing.  Abscess (Antibiotic Treatment Only)  An abscess (sometimes called a boil) happens when bacteria get trapped under the skin and start to grow. Pus forms inside the abscess as the body responds to the bacteria. An abscess can happen with an insect bite, ingrown hair, blocked oil gland, pimple, cyst, or puncture wound.  In the early stages, your wound may be red and tender. For this stage, you may get antibiotics. If the abscess does not get better with antibiotics, it will need to be drained with a small cut.  Home care  These tips will help you care for your abscess at home:  · Soak the wound in hot water or apply hot packs (small towel soaked in hot water) to the area for 20 minutes at a time. Do this 3 to 4 times a day.  · Do not cut, squeeze, or pop the boil yourself.  · Apply antibiotic cream or ointment to the skin 3 to 4 times a day, unless something else was prescribed. Some ointments include an antibiotic plus a pain reliever.  · If your doctor prescribed antibiotics, do not stop  taking them until you have finished the medicine or the doctor tells you to stop.  · You may use an over-the-counter pain medicine to control pain, unless another pain medicine was prescribed. If you have chronic liver or kidney disease or ever had a stomach ulcer or gastrointestinal bleeding, talk with your doctor before using these any of these.  Follow-up care  Follow up with your healthcare provider, or as advised. Check your wound each day for the signs of worsening infection listed below.  When to seek medical advice  Get prompt medical attention if any of these occur:  · An increase in redness or swelling  · Red streaks in the skin leading away from the abscess  · An increase in local pain or swelling  · Fever of 100.4ºF (38ºC) or higher, or as directed by your healthcare provider  · Pus or fluid coming from the abscess  · Boil returns after getting better  Date Last Reviewed: 9/1/2016  © 2643-7802 [employer]. [address] [place] [address], [place] [address]. All rights reserved. This information is not intended as a substitute for professional medical care. Always follow your healthcare professional's instructions.

## (undated) DEVICE — PRESTO INFLATION DEVICE

## (undated) DEVICE — SEE MEDLINE ITEM 156918

## (undated) DEVICE — TUBING CONTRAST INJCTN F-M 10

## (undated) DEVICE — GLOVE PROTEXIS PI CLASSIC 8.5

## (undated) DEVICE — OXISENSOR ADULT DIGIT N/S

## (undated) DEVICE — CATH CONQUEST 40 6X7X4X75

## (undated) DEVICE — GLOVE BIOGEL SKINSENSE PI 7.5

## (undated) DEVICE — GLOVE BIOGEL SKINSENSE PI 7.0

## (undated) DEVICE — GLOVE PROTEXIS PI CLASSIC 7.5

## (undated) DEVICE — GLOVE PROTEXIS PI CLASSIC 6.0

## (undated) DEVICE — FLOWSWITCH HP 1-W W/O LL

## (undated) DEVICE — GUIDEWIRE LAUREATE 035IN 180CM

## (undated) DEVICE — SET MICPUNC ACC STIFF CANNULA

## (undated) DEVICE — CATH ULTRAVERSE 035 6X40X75

## (undated) DEVICE — GLOVE PROTEXIS PI CLASSIC 9.0

## (undated) DEVICE — GOWN SMART IMP BREATHABLE XXLG

## (undated) DEVICE — SUT ETHILON 3-0 FS-1 30

## (undated) DEVICE — APPLICATOR CHLORAPREP CLR 10.5

## (undated) DEVICE — CHLORAPREP 10.5 ML APPLICATOR

## (undated) DEVICE — KIT PROBE COVER WITH GEL

## (undated) DEVICE — CATH CONQUEST 40 7X8X4X75

## (undated) DEVICE — CATH ANGIO SOFT VU 5FR 65CM

## (undated) DEVICE — GLOVE BIOGEL SKINSENSE PI 6.5

## (undated) DEVICE — SHEATH PINNACLE 6FR HIFLO

## (undated) DEVICE — CATH CONQUEST 40 6X4X75

## (undated) DEVICE — GUIDEWIRE UNIGLIDE .035 X 180

## (undated) DEVICE — Device

## (undated) DEVICE — CATH LUTONIX DCB 035X75X8X60